# Patient Record
Sex: FEMALE | Race: WHITE | NOT HISPANIC OR LATINO | URBAN - METROPOLITAN AREA
[De-identification: names, ages, dates, MRNs, and addresses within clinical notes are randomized per-mention and may not be internally consistent; named-entity substitution may affect disease eponyms.]

---

## 2020-02-19 ENCOUNTER — OFFICE VISIT (OUTPATIENT)
Dept: NEUROSURGERY | Facility: CLINIC | Age: 60
End: 2020-02-19
Payer: MEDICARE

## 2020-02-19 VITALS
HEART RATE: 59 BPM | WEIGHT: 253.06 LBS | DIASTOLIC BLOOD PRESSURE: 78 MMHG | SYSTOLIC BLOOD PRESSURE: 134 MMHG | BODY MASS INDEX: 40.67 KG/M2 | HEIGHT: 66 IN

## 2020-02-19 DIAGNOSIS — M47.812 CERVICAL SPONDYLOSIS: Primary | ICD-10-CM

## 2020-02-19 DIAGNOSIS — M47.814 THORACIC SPONDYLOSIS: ICD-10-CM

## 2020-02-19 DIAGNOSIS — Z96.89 S/P INSERTION OF SPINAL CORD STIMULATOR: ICD-10-CM

## 2020-02-19 PROBLEM — M17.11 PRIMARY OSTEOARTHRITIS OF RIGHT KNEE: Status: ACTIVE | Noted: 2017-08-02

## 2020-02-19 PROBLEM — I36.1 NON-RHEUMATIC TRICUSPID VALVE INSUFFICIENCY: Status: ACTIVE | Noted: 2019-02-26

## 2020-02-19 PROBLEM — I34.0 NON-RHEUMATIC MITRAL REGURGITATION: Status: ACTIVE | Noted: 2019-02-26

## 2020-02-19 PROCEDURE — 99204 OFFICE O/P NEW MOD 45 MIN: CPT | Mod: S$GLB,,, | Performed by: STUDENT IN AN ORGANIZED HEALTH CARE EDUCATION/TRAINING PROGRAM

## 2020-02-19 PROCEDURE — 3008F PR BODY MASS INDEX (BMI) DOCUMENTED: ICD-10-PCS | Mod: CPTII,S$GLB,, | Performed by: STUDENT IN AN ORGANIZED HEALTH CARE EDUCATION/TRAINING PROGRAM

## 2020-02-19 PROCEDURE — 99999 PR PBB SHADOW E&M-EST. PATIENT-LVL III: ICD-10-PCS | Mod: PBBFAC,,, | Performed by: STUDENT IN AN ORGANIZED HEALTH CARE EDUCATION/TRAINING PROGRAM

## 2020-02-19 PROCEDURE — 99204 PR OFFICE/OUTPT VISIT, NEW, LEVL IV, 45-59 MIN: ICD-10-PCS | Mod: S$GLB,,, | Performed by: STUDENT IN AN ORGANIZED HEALTH CARE EDUCATION/TRAINING PROGRAM

## 2020-02-19 PROCEDURE — 3008F BODY MASS INDEX DOCD: CPT | Mod: CPTII,S$GLB,, | Performed by: STUDENT IN AN ORGANIZED HEALTH CARE EDUCATION/TRAINING PROGRAM

## 2020-02-19 PROCEDURE — 99999 PR PBB SHADOW E&M-EST. PATIENT-LVL III: CPT | Mod: PBBFAC,,, | Performed by: STUDENT IN AN ORGANIZED HEALTH CARE EDUCATION/TRAINING PROGRAM

## 2020-02-19 RX ORDER — TRAMADOL HYDROCHLORIDE AND ACETAMINOPHEN 37.5; 325 MG/1; MG/1
1 TABLET, FILM COATED ORAL
COMMUNITY
End: 2022-05-25

## 2020-02-19 RX ORDER — OMEPRAZOLE 40 MG/1
CAPSULE, DELAYED RELEASE ORAL
COMMUNITY
Start: 2020-01-17 | End: 2022-05-25

## 2020-02-19 RX ORDER — FLUTICASONE PROPIONATE 50 MCG
SPRAY, SUSPENSION (ML) NASAL
COMMUNITY
Start: 2020-02-14

## 2020-02-19 RX ORDER — LOVASTATIN 10 MG/1
10 TABLET ORAL
COMMUNITY
Start: 2016-12-21

## 2020-02-19 RX ORDER — ALBUTEROL SULFATE 90 UG/1
AEROSOL, METERED RESPIRATORY (INHALATION)
COMMUNITY
Start: 2019-11-18

## 2020-02-19 RX ORDER — TIZANIDINE 4 MG/1
2-4 TABLET ORAL
COMMUNITY

## 2020-02-19 RX ORDER — SOTALOL HYDROCHLORIDE 80 MG/1
40 TABLET ORAL
COMMUNITY
Start: 2016-12-21 | End: 2022-05-25

## 2020-02-19 RX ORDER — GABAPENTIN 600 MG/1
600 TABLET ORAL
COMMUNITY
End: 2022-05-25

## 2020-02-19 RX ORDER — VALSARTAN AND HYDROCHLOROTHIAZIDE 320; 25 MG/1; MG/1
1 TABLET, FILM COATED ORAL DAILY
COMMUNITY
End: 2023-12-20

## 2020-02-19 NOTE — PROGRESS NOTES
[facility] Neurosurgery  Clinic Consult     Consult Requested By: Jasiel Mcneil  PCP: Primary Doctor No    SUBJECTIVE:     Chief Complaint:   Chief Complaint   Patient presents with    Cervical Spine Pain (C-spine)     patient reports history of cervical fusion; reports neck pain; denies arm pain; bilateral numbness in the hands    Lumbar Spine Pain (L-Spine)     patient reports low back pain that radiates into the right leg; numbness and tingling; trouble walking;     Mid-back Pain     patient reports falling june or july of 2019; compression fracture       History of Present Illness:  Nel Butler is a 59 y.o. female with Afib, HTN, LV dysfunction, pulmonary hypertension who presents for evaluation of neck and back pain.  Patient reports history of cervical fusion.  She reports in 2003 she experienced acute neck pain with weakness in the left arm.  She underwent anterior cervical fusion.  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  She underwent extension of the fusion.  She is now fused C4-T1.  She reports chronic neck pain following the surgery.  She reports bilateral hand numbness and tingling.  She also complains of chronic low back pain that radiates into the right leg greater than left leg, with associated numbness and tingling.  She is status post right knee replacement and recently had blood drawn off of the right knee.  She reports she cannot sit for long periods of time.  She does not sleep well due to the pain.  r.  She has a spinal cord stimulator, Plum Baby, placed in January 2019.  She states the stimulator does not touch her leg pain.  He sees pain management and last received injections 2-3 years ago.    VAS 5/10  PHQ 21  Oswestry Disability Index 56    History reviewed. No pertinent past medical history.  History reviewed. No pertinent surgical history.  History reviewed. No pertinent family history.  Social History     Tobacco Use    Smoking  status: Never Smoker   Substance Use Topics    Alcohol use: Not on file    Drug use: Not on file      Review of patient's allergies indicates:  No Known Allergies    Current Outpatient Medications:     albuterol (PROVENTIL/VENTOLIN HFA) 90 mcg/actuation inhaler, INHALE 2 PUFFS INTO THE LUNGS EVERY 6 HOURS AS NEEDED, Disp: , Rfl:     fluticasone propionate (FLONASE) 50 mcg/actuation nasal spray, , Disp: , Rfl:     gabapentin (NEURONTIN) 600 MG tablet, Take 600 mg by mouth., Disp: , Rfl:     loratadine 10 mg Cap, Take by mouth., Disp: , Rfl:     lovastatin (MEVACOR) 10 MG tablet, Take 10 mg by mouth., Disp: , Rfl:     omeprazole (PRILOSEC) 40 MG capsule, , Disp: , Rfl:     sotalol (BETAPACE) 80 MG tablet, Take 40 mg by mouth., Disp: , Rfl:     tiZANidine (ZANAFLEX) 4 MG tablet, Take 2-4 mg by mouth., Disp: , Rfl:     tramadol-acetaminophen 37.5-325 mg (ULTRACET) 37.5-325 mg Tab, Take 1 tablet by mouth., Disp: , Rfl:     valsartan-hydrochlorothiazide (DIOVAN-HCT) 320-25 mg per tablet, Take 1 tablet by mouth once daily., Disp: , Rfl:     Review of Systems:   Constitutional: no fever, chills or night sweats. No changes in weight   Eyes: no visual changes   ENT: no nasal congestion or sore throat   Respiratory: no cough or shortness of breath   Cardiovascular: no chest pain or palpitations   Gastrointestinal: no nausea or vomiting   Genitourinary: no hematuria or dysuria   Integument/Breast: no rash or pruritis   Hematologic/Lymphatic: no easy bruising or lymphadenopathy   Musculoskeletal: +back pain, neck pain, myalgias, arthralgias   Neurological: no seizures or tremors +paresthesias  Behavioral/Psych: no auditory or visual hallucinations   Endocrine: no heat or cold intolerance       OBJECTIVE:     Vital Signs (Most Recent):  Pulse: (!) 59 (02/19/20 1106)  BP: 134/78 (02/19/20 1106)    Physical Exam:   General: well developed, well nourished, no distress.   Neurologic: Alert and oriented. Thought content  appropriate. GCS 15.   Head: normocephalic, atraumatic  Eyes: EOMI.  Neck: trachea midline, no JVD   Cardiovascular: no LE edema  Pulmonary: normal respirations, no signs of respiratory distress  Abdomen: non-distended  Sensory: intact to light touch throughout  Skin: Skin is warm, dry and intact.    Motor Strength: Moves all extremities spontaneously with good tone. No abnormal movements seen.     Strength  Deltoids Triceps Biceps Wrist Extension Wrist Flexion Hand  Interossei   Upper: R 5/5 5/5 5/5 5/5 5/5 5/5 4+/5    L 5/5 5/5 5/5 5/5 5/5 5/5 4+/5     Iliopsoas Quadriceps Knee  Flexion Tibialis  anterior Gastro- cnemius EHL    Lower: R 5/5 5/5 5/5 5/5 5/5 5/5     L 5/5 5/5 5/5 5/5 5/5 5/5      DTR's: 1+  Boyd: present left   Clonus: absent  Gait: normal    Tandem Gait: mild difficulty            Cervical Spine: mildly decreased ROM        Diagnostic Results:  I have independently reviewed the following imaging:  MRI: Reviewed  Thoracic reviewed  + SCS  mutlievel spondylosis  T11-12 moderate stenosis without cord compression, without cord signal change      ASSESSMENT/PLAN:     There are no diagnoses linked to this encounter.    Nel Butler is a 59 y.o. female   Complex history  Cervical fusion x2 with residual pain and sensory symptoms, no progression  S/p Spinal cord stimulator with chronic pain  Thoracic spondylosis eval  No LE s/s thoracic myelopathy    rec non op treatment  Educated on s/s thoracic myelopathy and when to seek evaluation      We discussed non-surgical treatment options.  We discussed medical management and referral options including anti-inflammatories, muscle relaxants, narcotics and neuropathic pain medications.  We discussed physical therapy for back strengthening and flexibility.  We talked about injection therapy for both treatment and diagnosis.    Patient verbalized understanding of plan. Encouraged to call with any questions or concerns.     This note was partially dictated  using voice recognition software, so please excuse any errors that were not corrected.

## 2020-03-12 ENCOUNTER — TELEPHONE (OUTPATIENT)
Dept: RADIOLOGY | Facility: HOSPITAL | Age: 60
End: 2020-03-12

## 2020-05-28 DIAGNOSIS — M54.12 CERVICAL RADICULOPATHY: Primary | ICD-10-CM

## 2020-05-28 RX ORDER — METHYLPREDNISOLONE 4 MG/1
TABLET ORAL
Qty: 1 PACKAGE | Refills: 0 | Status: SHIPPED | OUTPATIENT
Start: 2020-05-28 | End: 2020-06-18

## 2020-05-28 NOTE — TELEPHONE ENCOUNTER
Patient calling saying that she is having persistent, worsening left neck, shoulder and arm pain. She has not done any PT or injections since she was last here. She is asking what we would recommend for her at this point.

## 2022-05-25 ENCOUNTER — OFFICE VISIT (OUTPATIENT)
Dept: NEUROSURGERY | Facility: CLINIC | Age: 62
End: 2022-05-25
Payer: MEDICARE

## 2022-05-25 VITALS
DIASTOLIC BLOOD PRESSURE: 86 MMHG | HEART RATE: 75 BPM | RESPIRATION RATE: 18 BRPM | HEIGHT: 66 IN | BODY MASS INDEX: 40.67 KG/M2 | WEIGHT: 253.06 LBS | SYSTOLIC BLOOD PRESSURE: 176 MMHG

## 2022-05-25 DIAGNOSIS — M51.36 DDD (DEGENERATIVE DISC DISEASE), LUMBAR: ICD-10-CM

## 2022-05-25 DIAGNOSIS — Z96.89 SPINAL CORD STIMULATOR STATUS: ICD-10-CM

## 2022-05-25 DIAGNOSIS — Z98.1 HISTORY OF SPINAL FUSION: ICD-10-CM

## 2022-05-25 DIAGNOSIS — M47.812 CERVICAL SPONDYLOSIS: Primary | ICD-10-CM

## 2022-05-25 DIAGNOSIS — M50.10 CERVICAL DISC DISORDER WITH RADICULOPATHY: ICD-10-CM

## 2022-05-25 PROCEDURE — 3008F PR BODY MASS INDEX (BMI) DOCUMENTED: ICD-10-PCS | Mod: CPTII,S$GLB,, | Performed by: STUDENT IN AN ORGANIZED HEALTH CARE EDUCATION/TRAINING PROGRAM

## 2022-05-25 PROCEDURE — 3079F PR MOST RECENT DIASTOLIC BLOOD PRESSURE 80-89 MM HG: ICD-10-PCS | Mod: CPTII,S$GLB,, | Performed by: STUDENT IN AN ORGANIZED HEALTH CARE EDUCATION/TRAINING PROGRAM

## 2022-05-25 PROCEDURE — 99215 PR OFFICE/OUTPT VISIT, EST, LEVL V, 40-54 MIN: ICD-10-PCS | Mod: S$GLB,,, | Performed by: STUDENT IN AN ORGANIZED HEALTH CARE EDUCATION/TRAINING PROGRAM

## 2022-05-25 PROCEDURE — 3079F DIAST BP 80-89 MM HG: CPT | Mod: CPTII,S$GLB,, | Performed by: STUDENT IN AN ORGANIZED HEALTH CARE EDUCATION/TRAINING PROGRAM

## 2022-05-25 PROCEDURE — 99215 OFFICE O/P EST HI 40 MIN: CPT | Mod: S$GLB,,, | Performed by: STUDENT IN AN ORGANIZED HEALTH CARE EDUCATION/TRAINING PROGRAM

## 2022-05-25 PROCEDURE — 3008F BODY MASS INDEX DOCD: CPT | Mod: CPTII,S$GLB,, | Performed by: STUDENT IN AN ORGANIZED HEALTH CARE EDUCATION/TRAINING PROGRAM

## 2022-05-25 PROCEDURE — 3077F SYST BP >= 140 MM HG: CPT | Mod: CPTII,S$GLB,, | Performed by: STUDENT IN AN ORGANIZED HEALTH CARE EDUCATION/TRAINING PROGRAM

## 2022-05-25 PROCEDURE — 1159F PR MEDICATION LIST DOCUMENTED IN MEDICAL RECORD: ICD-10-PCS | Mod: CPTII,S$GLB,, | Performed by: STUDENT IN AN ORGANIZED HEALTH CARE EDUCATION/TRAINING PROGRAM

## 2022-05-25 PROCEDURE — 3077F PR MOST RECENT SYSTOLIC BLOOD PRESSURE >= 140 MM HG: ICD-10-PCS | Mod: CPTII,S$GLB,, | Performed by: STUDENT IN AN ORGANIZED HEALTH CARE EDUCATION/TRAINING PROGRAM

## 2022-05-25 PROCEDURE — 1159F MED LIST DOCD IN RCRD: CPT | Mod: CPTII,S$GLB,, | Performed by: STUDENT IN AN ORGANIZED HEALTH CARE EDUCATION/TRAINING PROGRAM

## 2022-05-25 RX ORDER — NITROFURANTOIN MACROCRYSTALS 50 MG/1
50 CAPSULE ORAL DAILY
COMMUNITY
Start: 2021-09-27 | End: 2023-12-20

## 2022-05-25 RX ORDER — MULTIVIT-MIN/FA/LYCOPEN/LUTEIN .4-300-25
1 TABLET ORAL DAILY
COMMUNITY

## 2022-05-25 RX ORDER — TRAMADOL HYDROCHLORIDE 50 MG/1
50 TABLET ORAL 2 TIMES DAILY PRN
COMMUNITY

## 2022-05-25 NOTE — PROGRESS NOTES
[facility]  Clinic Consult       PCP: Primary Doctor No    SUBJECTIVE:     Chief Complaint:   Chief Complaint   Patient presents with    Neck Pain     L sided neck pain into the arm and hand with numbness, tingling and arm weakness.  Having dizziness and blurry vision as well as headaches that come and go.  Also complains of mid back pain today.  Felling off balance.        History of Present Illness 2/19/2020  Nel Butler is a 59 y.o. female with Afib, HTN, LV dysfunction, pulmonary hypertension who presents for evaluation of neck and back pain.  Patient reports history of cervical fusion.  She reports in 2003 she experienced acute neck pain with weakness in the left arm.  She underwent anterior cervical fusion.  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  She underwent extension of the fusion.  She is now fused C4-T1.  She reports chronic neck pain following the surgery.  She reports bilateral hand numbness and tingling.  She also complains of chronic low back pain that radiates into the right leg greater than left leg, with associated numbness and tingling.  She is status post right knee replacement and recently had blood drawn off of the right knee.  She reports she cannot sit for long periods of time.  She does not sleep well due to the pain. She has a spinal cord stimulator, BMEYE, placed in January 2019.  She states the stimulator does not touch her leg pain.  He sees pain management and last received injections 2-3 years ago.        Interval History 5/25/2022  Nel Butler is a 61 y.o. female who presents for evaluation of neck pain, arm pain and arm weakness. Patient reports worsening of the left sided neck pain and left arm pain. She reports pain in the C8 distribution of the left arm with associated numbness/tingling. She feels as though her arm is weak, especially her hand. She does report worsening hand  dexterity and more frequently dropping objects. She continues to experience mid back and low back pain. Since her last visit, she was put on Eliquis for her afib. She was hospitalized due to her left arm pain and underwent complete cardiac workup.     Pertinent and recent history, provider evaluations, imaging and data reviewed in EPIC        Past Medical History:   Diagnosis Date    HLD (hyperlipidemia)     HTN (hypertension)     Osteopenia     Paroxysmal atrial fibrillation      Past Surgical History:   Procedure Laterality Date    CERVICAL FUSION      KNEE ARTHROPLASTY Right     SPINAL CORD STIMULATOR IMPLANT  2019     No family history on file.  Social History     Tobacco Use    Smoking status: Never Smoker      Review of patient's allergies indicates:   Allergen Reactions    Nsaids (non-steroidal anti-inflammatory drug) Other (See Comments)     gastritis       Current Outpatient Medications:     apixaban (ELIQUIS) 5 mg Tab, Take 5 mg by mouth 2 (two) times a day., Disp: , Rfl:     nitrofurantoin (MACRODANTIN) 50 MG capsule, Take 50 mg by mouth once daily., Disp: , Rfl:     albuterol (PROVENTIL/VENTOLIN HFA) 90 mcg/actuation inhaler, INHALE 2 PUFFS INTO THE LUNGS EVERY 6 HOURS AS NEEDED, Disp: , Rfl:     calcium carbonate/vitamin D3 (CALCIUM 600 + D,3, ORAL), Take 600 Units by mouth once daily., Disp: , Rfl:     fluticasone propionate (FLONASE) 50 mcg/actuation nasal spray, , Disp: , Rfl:     loratadine 10 mg Cap, Take by mouth., Disp: , Rfl:     lovastatin (MEVACOR) 10 MG tablet, Take 10 mg by mouth., Disp: , Rfl:     multivit-min-FA-lycopen-lutein (CENTRUM SILVER) 0.4 mg-300 mcg- 250 mcg Tab, Take 1 capsule by mouth once daily., Disp: , Rfl:     tiZANidine (ZANAFLEX) 4 MG tablet, Take 2-4 mg by mouth., Disp: , Rfl:     traMADoL (ULTRAM) 50 mg tablet, Take 50 mg by mouth 2 (two) times daily as needed., Disp: , Rfl:     valsartan-hydrochlorothiazide (DIOVAN-HCT) 320-25 mg per tablet, Take 1  "tablet by mouth once daily., Disp: , Rfl:     Review of Systems:   Constitutional: no fever, chills or night sweats. No changes in weight   Eyes: no visual changes   ENT: no nasal congestion or sore throat   Respiratory: no cough or shortness of breath   Cardiovascular: no chest pain or palpitations   Gastrointestinal: no nausea or vomiting   Genitourinary: no hematuria or dysuria   Integument/Breast: no rash or pruritis   Hematologic/Lymphatic: no easy bruising or lymphadenopathy   Musculoskeletal: +back pain, neck pain, myalgias, arthralgias   Neurological: no seizures or tremors +paresthesias  Behavioral/Psych: no auditory or visual hallucinations   Endocrine: no heat or cold intolerance         OBJECTIVE:     Vital Signs (Most Recent):  Pulse: 75 (05/25/22 1127)  Resp: 18 (05/25/22 1127)  BP: (!) 176/86 (05/25/22 1127)  Estimated body mass index is 40.85 kg/m² as calculated from the following:    Height as of this encounter: 5' 6" (1.676 m).    Weight as of this encounter: 114.8 kg (253 lb 1.4 oz).    Physical Exam:   General: well developed, well nourished, no distress.   Neurologic: Alert and oriented. Thought content appropriate. GCS 15.   Head: normocephalic, atraumatic  Eyes: EOMI.  Neck: trachea midline, no JVD   Cardiovascular: no LE edema  Pulmonary: normal respirations, no signs of respiratory distress  Abdomen: non-distended  Sensory: diminished left ulnar distribution   Skin: Skin is warm, dry and intact.     Motor Strength: Moves all extremities spontaneously with good tone. No abnormal movements seen.      Strength   Deltoids Triceps Biceps Wrist Extension Wrist Flexion Hand  Interossei   Upper: R 5/5 5/5 5/5 5/5 5/5 5/5 4+/5     L 5/5 5/5 5/5 5/5 5/5 5/5 4/5       Iliopsoas Quadriceps Knee  Flexion Tibialis  anterior Gastro- cnemius EHL     Lower: R 5/5 5/5 5/5 5/5 5/5 5/5       L 5/5 5/5 5/5 5/5 5/5 5/5        DTR's: 1+  Boyd: absent   Gait: wide, slow                   Tandem Gait: " deferred           Cervical Spine: mildly decreased ROM, TTP   +Tinel's left elbow         Diagnostic Results:  I have independently reviewed the following imaging:  MRI: Reviewed  r    C4-T1 ACDF 2 plates C4-5 and 7-1  C2-3 mild anterolisthesis  C3-4 disc collapse, anterior osteophyte  Central disc bulge abutting cord  Dorsal csf present  Hx of csc with mid cervical myelomalaica        ASSESSMENT/PLAN:     Cervical spondylosis  -     CT Cervical Spine Without Contrast; Future; Expected date: 05/25/2022  -     X-Ray Cervical Spine AP Lat with Flexion  Extension; Future; Expected date: 05/25/2022  -     DXA Bone Density Spine And Hip; Future; Expected date: 05/25/2022    DDD (degenerative disc disease), lumbar    History of spinal fusion    Cervical disc disorder with radiculopathy    Spinal cord stimulator status    BMI 40.0-44.9, adult        Nel Butler is a 61 y.o. female  With a very complex hx  Of neck and back , arm and leg pain  Hx of multiple cervical surgeries , carotid incision on left  Low transvere on right  scs placed    All of this by outside surgeons, lives in [facility]  Chronic imbalance and myelopathy secondary    C4-T1 ACDF with 2 remote plates  Myelomalacia mid cervical without compression    C2-3 mild anterolistehsis  C3-4 severe collapse, ventral disc/osteophyte impressing cord with loss of csf  dorasal remains    Main new c/o is left arm pain  Has c3-4 ASD no previous comparison  uncertain this is the left arm pain generator   Myelopathy stable   Will get CT assess fusions/ bony anatomy  And diagnostic/therapeutic C3-4 focused BRADLEY      Very complicated problems and multifactorial etiology    Will see has she responds                       Patient verbalized understanding of plan. Encouraged to call with any questions or concerns.     This note was partially dictated using voice recognition software, so please excuse any errors that were not corrected.

## 2022-06-02 ENCOUNTER — TELEPHONE (OUTPATIENT)
Dept: RADIOLOGY | Facility: HOSPITAL | Age: 62
End: 2022-06-02
Payer: MEDICARE

## 2022-06-06 ENCOUNTER — TELEPHONE (OUTPATIENT)
Dept: PAIN MEDICINE | Facility: CLINIC | Age: 62
End: 2022-06-06
Payer: MEDICARE

## 2022-06-06 ENCOUNTER — HOSPITAL ENCOUNTER (OUTPATIENT)
Dept: RADIOLOGY | Facility: HOSPITAL | Age: 62
Discharge: HOME | End: 2022-06-06
Attending: STUDENT IN AN ORGANIZED HEALTH CARE EDUCATION/TRAINING PROGRAM
Payer: MEDICARE

## 2022-06-06 ENCOUNTER — HOSPITAL ENCOUNTER (OUTPATIENT)
Dept: RADIOLOGY | Facility: HOSPITAL | Age: 62
Discharge: HOME | End: 2022-06-06
Attending: STUDENT IN AN ORGANIZED HEALTH CARE EDUCATION/TRAINING PROGRAM

## 2022-06-06 DIAGNOSIS — M47.812 CERVICAL SPONDYLOSIS: ICD-10-CM

## 2022-06-06 PROCEDURE — 77080 DXA BONE DENSITY AXIAL: CPT | Mod: TC,PO

## 2022-06-06 PROCEDURE — 72050 X-RAY EXAM NECK SPINE 4/5VWS: CPT | Mod: TC,PO

## 2022-06-06 PROCEDURE — 72125 CT NECK SPINE W/O DYE: CPT | Mod: TC,PO

## 2022-06-06 NOTE — TELEPHONE ENCOUNTER
Call placed to Pt to schedule for cervical BRADLEY with IV Sedation. Procedure scheduled for 06-22-22. F/U scheduled for 7-7-22. Pt is taking Eliquis ordered by Dr. Mikel Ramirez at [facility]. Clearance request forwarded for approval to hold x7 days.Pt is aware that she should not hold until clearance is received.

## 2022-06-10 ENCOUNTER — TELEPHONE (OUTPATIENT)
Dept: PAIN MEDICINE | Facility: CLINIC | Age: 62
End: 2022-06-10
Payer: MEDICARE

## 2022-06-10 DIAGNOSIS — M54.12 CERVICAL RADICULOPATHY: Primary | ICD-10-CM

## 2022-06-10 RX ORDER — SODIUM CHLORIDE, SODIUM LACTATE, POTASSIUM CHLORIDE, CALCIUM CHLORIDE 600; 310; 30; 20 MG/100ML; MG/100ML; MG/100ML; MG/100ML
INJECTION, SOLUTION INTRAVENOUS CONTINUOUS
Status: CANCELLED | OUTPATIENT
Start: 2022-06-22

## 2022-06-10 NOTE — TELEPHONE ENCOUNTER
Call placed to notify Pt that clearance orders have been received and approved for her to hold her Eliquis 7 days prior to her procedure. Pt verbalized understanding. Cervical BRADLEY moved to 6-29-22. Pt verbalized understanding.

## 2022-06-24 RX ORDER — MONTELUKAST SODIUM 10 MG/1
10 TABLET ORAL NIGHTLY
COMMUNITY

## 2022-06-24 RX ORDER — OMEPRAZOLE 10 MG/1
10 CAPSULE, DELAYED RELEASE ORAL DAILY
COMMUNITY
End: 2022-08-18

## 2022-06-24 RX ORDER — GABAPENTIN 300 MG/1
300 CAPSULE ORAL NIGHTLY
COMMUNITY
End: 2023-12-20

## 2022-06-29 ENCOUNTER — HOSPITAL ENCOUNTER (OUTPATIENT)
Facility: HOSPITAL | Age: 62
Discharge: HOME | End: 2022-06-29
Attending: ANESTHESIOLOGY | Admitting: ANESTHESIOLOGY
Payer: MEDICARE

## 2022-06-29 ENCOUNTER — HOSPITAL ENCOUNTER (OUTPATIENT)
Dept: RADIOLOGY | Facility: HOSPITAL | Age: 62
Discharge: HOME | End: 2022-06-29
Attending: ANESTHESIOLOGY
Payer: MEDICARE

## 2022-06-29 VITALS
SYSTOLIC BLOOD PRESSURE: 143 MMHG | RESPIRATION RATE: 16 BRPM | TEMPERATURE: 98 F | HEART RATE: 71 BPM | HEIGHT: 66 IN | DIASTOLIC BLOOD PRESSURE: 74 MMHG | OXYGEN SATURATION: 100 % | BODY MASS INDEX: 35.36 KG/M2 | WEIGHT: 220 LBS

## 2022-06-29 DIAGNOSIS — M54.12 CERVICAL RADICULOPATHY: Primary | ICD-10-CM

## 2022-06-29 DIAGNOSIS — M54.2 NECK PAIN: ICD-10-CM

## 2022-06-29 PROCEDURE — 76000 FLUOROSCOPY <1 HR PHYS/QHP: CPT | Mod: TC,PO

## 2022-06-29 PROCEDURE — 63600175 PHARM REV CODE 636 W HCPCS: Mod: PO | Performed by: ANESTHESIOLOGY

## 2022-06-29 PROCEDURE — 25500020 PHARM REV CODE 255: Mod: PO | Performed by: ANESTHESIOLOGY

## 2022-06-29 PROCEDURE — 62321 NJX INTERLAMINAR CRV/THRC: CPT | Mod: PO | Performed by: ANESTHESIOLOGY

## 2022-06-29 PROCEDURE — 25000003 PHARM REV CODE 250: Mod: PO | Performed by: ANESTHESIOLOGY

## 2022-06-29 PROCEDURE — 62321 NJX INTERLAMINAR CRV/THRC: CPT | Mod: ,,, | Performed by: ANESTHESIOLOGY

## 2022-06-29 PROCEDURE — 62321 PR INJ CERV/THORAC, W/GUIDANCE: ICD-10-PCS | Mod: ,,, | Performed by: ANESTHESIOLOGY

## 2022-06-29 RX ORDER — DEXAMETHASONE SODIUM PHOSPHATE 10 MG/ML
INJECTION INTRAMUSCULAR; INTRAVENOUS
Status: DISCONTINUED | OUTPATIENT
Start: 2022-06-29 | End: 2022-06-29 | Stop reason: HOSPADM

## 2022-06-29 RX ORDER — SODIUM CHLORIDE 0.9 G/100ML
IRRIGANT IRRIGATION
Status: DISCONTINUED | OUTPATIENT
Start: 2022-06-29 | End: 2022-06-29 | Stop reason: HOSPADM

## 2022-06-29 RX ORDER — SODIUM CHLORIDE, SODIUM LACTATE, POTASSIUM CHLORIDE, CALCIUM CHLORIDE 600; 310; 30; 20 MG/100ML; MG/100ML; MG/100ML; MG/100ML
INJECTION, SOLUTION INTRAVENOUS CONTINUOUS
Status: DISCONTINUED | OUTPATIENT
Start: 2022-06-29 | End: 2022-06-29 | Stop reason: HOSPADM

## 2022-06-29 RX ORDER — LIDOCAINE HYDROCHLORIDE 10 MG/ML
INJECTION, SOLUTION EPIDURAL; INFILTRATION; INTRACAUDAL; PERINEURAL
Status: DISCONTINUED | OUTPATIENT
Start: 2022-06-29 | End: 2022-06-29 | Stop reason: HOSPADM

## 2022-06-29 RX ORDER — MIDAZOLAM HYDROCHLORIDE 2 MG/2ML
INJECTION, SOLUTION INTRAMUSCULAR; INTRAVENOUS
Status: DISCONTINUED | OUTPATIENT
Start: 2022-06-29 | End: 2022-06-29 | Stop reason: HOSPADM

## 2022-06-29 RX ADMIN — SODIUM CHLORIDE, SODIUM LACTATE, POTASSIUM CHLORIDE, AND CALCIUM CHLORIDE: .6; .31; .03; .02 INJECTION, SOLUTION INTRAVENOUS at 03:06

## 2022-06-29 NOTE — H&P
[place] - Surgery  History & Physical - Short Stay  Pain Management       SUBJECTIVE:     Procedure: Procedure(s) (LRB):  Injection-steroid-epidural-cervical (N/A)    Chief Complaint/Reason for Admission:  Cervical radiculopathy [M54.12]    PTA Medications   Medication Sig    apixaban (ELIQUIS) 5 mg Tab Take 5 mg by mouth 2 (two) times a day.    calcium carbonate/vitamin D3 (CALCIUM 600 + D,3, ORAL) Take 600 Units by mouth once daily.    fluticasone propionate (FLONASE) 50 mcg/actuation nasal spray     gabapentin (NEURONTIN) 300 MG capsule Take 300 mg by mouth every evening.    lovastatin (MEVACOR) 10 MG tablet Take 10 mg by mouth.    montelukast (SINGULAIR) 10 mg tablet Take 10 mg by mouth every evening.    multivit-min-FA-lycopen-lutein (CENTRUM SILVER) 0.4 mg-300 mcg- 250 mcg Tab Take 1 capsule by mouth once daily.    nitrofurantoin (MACRODANTIN) 50 MG capsule Take 50 mg by mouth once daily.    omeprazole (PRILOSEC) 10 MG capsule Take 10 mg by mouth once daily.    traMADoL (ULTRAM) 50 mg tablet Take 50 mg by mouth 2 (two) times daily as needed.    valsartan-hydrochlorothiazide (DIOVAN-HCT) 320-25 mg per tablet Take 1 tablet by mouth once daily.    albuterol (PROVENTIL/VENTOLIN HFA) 90 mcg/actuation inhaler INHALE 2 PUFFS INTO THE LUNGS EVERY 6 HOURS AS NEEDED    loratadine 10 mg Cap Take by mouth.    tiZANidine (ZANAFLEX) 4 MG tablet Take 2-4 mg by mouth.       Review of patient's allergies indicates:   Allergen Reactions    Nsaids (non-steroidal anti-inflammatory drug) Other (See Comments)     gastritis       Past Medical History:   Diagnosis Date    HLD (hyperlipidemia)     HTN (hypertension)     Osteopenia     Paroxysmal atrial fibrillation      Past Surgical History:   Procedure Laterality Date    CERVICAL FUSION      KNEE ARTHROPLASTY Right     SPINAL CORD STIMULATOR IMPLANT  2019     History reviewed. No pertinent family history.  Social History     Tobacco Use    Smoking status:  Never Smoker   Substance Use Topics    Alcohol use: Not Currently    Drug use: Never        Current Facility-Administered Medications:     lactated ringers infusion, , Intravenous, Continuous, Jc Martell MD    Review of Systems:  General ROS: negative for - fever  Dermatological ROS: negative for rash    OBJECTIVE:     Vital Signs (Most Recent):  Temp: 98.2 °F (36.8 °C) (06/29/22 1435)  Pulse: 70 (06/29/22 1435)  Resp: 17 (06/29/22 1435)  BP: (!) 143/72 (06/29/22 1435)  SpO2: 99 % (06/29/22 1435)  Body mass index is 35.51 kg/m².    Physical Exam:  General appearance - alert, well appearing, and in no distress  Mental status - AOx3  Eyes - pupils equal and reactive, extraocular eye movements intact  Heart - normal rate, regular rhythm, normal S1, S2, no murmurs, rubs, clicks or gallops  Chest - clear to auscultation, no wheezes, rales or rhonchi, symmetric air entry  Abdomen - soft, nontender, nondistended, no masses or organomegaly  Neurological - alert, oriented, normal speech, no focal findings or movement disorder noted  Extremities - peripheral pulses normal, no pedal edema, no clubbing or cyanosis      ASSESSMENT/PLAN:     There are no hospital problems to display for this patient.     Referred by neurosurgery for cervical BRADLEY.  No changes since seen on 5/25/22 by nsgy. Denies any new numbness or weakness, continues to have her typical weakness.    Held eliquis for 3 days.  plts 235 on 4/12/22.    We will proceed with cervical BRADLEY. The risks and benefits of this intervention, and alternative therapies were discussed with the patient.  The discussion of risks included infection, bleeding, need for additional procedures or surgery, nerve damage.  Questions regarding the procedure, risks, expected outcome, and possible side effects were solicited and answered to the patient's satisfaction.  Nel Butler wishes to proceed with the injection or procedure.  Written consent was obtained.      Proceed with  intervention as scheduled.    Jc Martell M.D.  Interventional Pain Medicine / Anesthesiology

## 2022-06-29 NOTE — OP NOTE
"Procedure Note    Procedure Date: 6/29/2022    Procedure Performed:  C7-T1 cervical interlaminar epidural steroid injection under fluoroscopy.    Indications: Patient has failed conservative therapy.      Pre-op diagnosis: Cervical Radiculopathy    Post-op diagnosis: same    Physician: Jc Martell MD    IV anxiolysis medications: versed 2mg    Medications injected: Dexamethasone 15mg, 3.5 mL sterile, preservative-free normal saline.    Local anesthetic used: 1% Lidocaine, 1 ml, 8.4% sodium bicarbonate 0.25ml    Estimated Blood Loss: none    Complications:  none    Technique:  The patient was interviewed in the holding area and Risks/Benefits were discussed, including, but not limited to, the possibility of new or different pain, bleeding or infection.   All questions were answered.  The patient understood and accepted risks.  Consent was verfied.  A time-out was taken to identify patient and procedure prior to starting the procedure.  With the patient laying in a prone position with the neck in a mid-flexed forward position, the area was prepped and draped in the usual sterile fashion using ChloraPrep and a fenestrated drape.  The area was determined under AP fluoroscopic guidance.  The skin and subcutaneous tissues overlying the targeted interspace were anesthetized with 3-5 mL of 1% Lidocaine using a 25G 1.5" needle.  A 20G, 3.5" Tuohy epidural needle was inserted through the anesthetized skin and directed toward the interspace under fluoroscopic guidance until T1 lamina was contacted.  The fluoroscope was then adjusted to yield a contralateral oblique view of the C7-T1 interspace.  The epidural needle was incrementally walked cephalad off of the lamina until the ligamentum flavum was engaged. From this point, a loss-of-resistance technique was used to identify entrance of the needle into the epidural space.  Once the tip of the needle was in the desired position, the contrast dye Omnipaque was injected to " determine placement and no vascular uptake.  Then, after negative aspiration, dexamethasone 15 mg + 3.5 cc NS was injected.  The needle was flushed with normal saline and removed. The contrast was seen to be displaced after injection. Patient was awake/responsive during all injections.  The patient tolerated the procedure well and was transferred to the [facility] in stable condition.  The patient was monitored after the procedure and was given post-procedure and discharge instructions to follow at home. The patient was discharged in a stable condition.

## 2022-06-29 NOTE — PLAN OF CARE
Pt meets criteria for discharge. Vital signs stable. Pt without falls. Discharge teaching complete. Questions answered.

## 2022-06-29 NOTE — DISCHARGE SUMMARY
[facility]  Discharge Note  Short Stay    Admit Date: 6/29/2022    Discharge Date: 6/29/2022    Attending Physician: Jc Martell     Discharge Provider: Jc Martell    Diagnoses:  There are no hospital problems to display for this patient.      Discharged Condition: Good    Final Diagnoses: Cervical radiculopathy [M54.12]    Disposition: Home or Self Care    Hospital Course: No complications, uneventful    Outcome of Hospitalization, Treatment, Procedure, or Surgery:  Patient was admitted for outpatient interventional pain management procedure. The patient tolerated the procedure well with no complications.    Follow up/Patient Instructions:  Follow up as scheduled in Pain Management office in 2-3 weeks.  Patient has received instructions and follow up date and time.    Medications:  Continue previous medications, except restart eliquis in 24 hours    Discharge Procedure Orders   Notify your health care provider if you experience any of the following:  temperature >100.4     Notify your health care provider if you experience any of the following:  persistent nausea and vomiting or diarrhea     Notify your health care provider if you experience any of the following:  severe uncontrolled pain     Notify your health care provider if you experience any of the following:  redness, tenderness, or signs of infection (pain, swelling, redness, odor or green/yellow discharge around incision site)     Notify your health care provider if you experience any of the following:  difficulty breathing or increased cough     Notify your health care provider if you experience any of the following:  severe persistent headache     Notify your health care provider if you experience any of the following:  worsening rash     Notify your health care provider if you experience any of the following:  persistent dizziness, light-headedness, or visual disturbances     Notify your health care provider if you experience any of the  following:  increased confusion or weakness     Activity as tolerated

## 2022-07-17 ENCOUNTER — PATIENT MESSAGE (OUTPATIENT)
Dept: PAIN MEDICINE | Facility: CLINIC | Age: 62
End: 2022-07-17
Payer: MEDICARE

## 2022-08-18 ENCOUNTER — OFFICE VISIT (OUTPATIENT)
Dept: PAIN MEDICINE | Facility: CLINIC | Age: 62
End: 2022-08-18
Payer: MEDICARE

## 2022-08-18 VITALS
BODY MASS INDEX: 42.52 KG/M2 | SYSTOLIC BLOOD PRESSURE: 142 MMHG | DIASTOLIC BLOOD PRESSURE: 66 MMHG | HEIGHT: 66 IN | WEIGHT: 264.56 LBS | HEART RATE: 64 BPM

## 2022-08-18 DIAGNOSIS — M54.12 CERVICAL RADICULOPATHY: Primary | ICD-10-CM

## 2022-08-18 DIAGNOSIS — M50.30 DDD (DEGENERATIVE DISC DISEASE), CERVICAL: ICD-10-CM

## 2022-08-18 PROCEDURE — 99999 PR PBB SHADOW E&M-EST. PATIENT-LVL III: CPT | Mod: PBBFAC,,, | Performed by: ANESTHESIOLOGY

## 2022-08-18 PROCEDURE — 99999 PR PBB SHADOW E&M-EST. PATIENT-LVL III: ICD-10-PCS | Mod: PBBFAC,,, | Performed by: ANESTHESIOLOGY

## 2022-08-18 PROCEDURE — 1159F MED LIST DOCD IN RCRD: CPT | Mod: CPTII,S$GLB,, | Performed by: ANESTHESIOLOGY

## 2022-08-18 PROCEDURE — 3077F PR MOST RECENT SYSTOLIC BLOOD PRESSURE >= 140 MM HG: ICD-10-PCS | Mod: CPTII,S$GLB,, | Performed by: ANESTHESIOLOGY

## 2022-08-18 PROCEDURE — 3077F SYST BP >= 140 MM HG: CPT | Mod: CPTII,S$GLB,, | Performed by: ANESTHESIOLOGY

## 2022-08-18 PROCEDURE — 1160F RVW MEDS BY RX/DR IN RCRD: CPT | Mod: CPTII,S$GLB,, | Performed by: ANESTHESIOLOGY

## 2022-08-18 PROCEDURE — 99214 PR OFFICE/OUTPT VISIT, EST, LEVL IV, 30-39 MIN: ICD-10-PCS | Mod: S$GLB,,, | Performed by: ANESTHESIOLOGY

## 2022-08-18 PROCEDURE — 1160F PR REVIEW ALL MEDS BY PRESCRIBER/CLIN PHARMACIST DOCUMENTED: ICD-10-PCS | Mod: CPTII,S$GLB,, | Performed by: ANESTHESIOLOGY

## 2022-08-18 PROCEDURE — 3078F PR MOST RECENT DIASTOLIC BLOOD PRESSURE < 80 MM HG: ICD-10-PCS | Mod: CPTII,S$GLB,, | Performed by: ANESTHESIOLOGY

## 2022-08-18 PROCEDURE — 1159F PR MEDICATION LIST DOCUMENTED IN MEDICAL RECORD: ICD-10-PCS | Mod: CPTII,S$GLB,, | Performed by: ANESTHESIOLOGY

## 2022-08-18 PROCEDURE — 3008F BODY MASS INDEX DOCD: CPT | Mod: CPTII,S$GLB,, | Performed by: ANESTHESIOLOGY

## 2022-08-18 PROCEDURE — 99214 OFFICE O/P EST MOD 30 MIN: CPT | Mod: S$GLB,,, | Performed by: ANESTHESIOLOGY

## 2022-08-18 PROCEDURE — 3008F PR BODY MASS INDEX (BMI) DOCUMENTED: ICD-10-PCS | Mod: CPTII,S$GLB,, | Performed by: ANESTHESIOLOGY

## 2022-08-18 PROCEDURE — 3078F DIAST BP <80 MM HG: CPT | Mod: CPTII,S$GLB,, | Performed by: ANESTHESIOLOGY

## 2022-08-18 RX ORDER — METHYLPREDNISOLONE 4 MG/1
TABLET ORAL
COMMUNITY
Start: 2022-04-06 | End: 2023-12-20

## 2022-08-18 RX ORDER — ONDANSETRON 4 MG/1
4 TABLET, FILM COATED ORAL 2 TIMES DAILY PRN
COMMUNITY
Start: 2022-04-18

## 2022-08-18 NOTE — PROGRESS NOTES
[facility] New Patient Evaluation    Referred by: Dr. Ceja  Reason for referral: neck pain    CC:   Chief Complaint   Patient presents with    Neck Pain      Last 3 PDI Scores 8/18/2022   Pain Disability Index (PDI) 24       HPI:   Nel Butler is a 61 y.o. female who presents with neck pain.  She was referred to me by Neurosurgery for BRADLEY and is now s/p cervical BRADLEY on 6/29/22.  She reports she had near complete relief of her left-sided arm pain for about 2 weeks following the injection however after that the pain started to return.  Today she reports her pain is 8/10, constant, aching with radiating pain from the neck to the left shoulder and down the left arm.  She reports she continues to have chronic weakness of her left  strength that has been present since prior to her other surgeries.  Her pain is worse with sitting, lying, walking, nighttime and relieved with medications.    History:    Past Medical History:   Diagnosis Date    HLD (hyperlipidemia)     HTN (hypertension)     Osteopenia     Paroxysmal atrial fibrillation        Past Surgical History:   Procedure Laterality Date    CERVICAL FUSION      EPIDURAL STEROID INJECTION INTO CERVICAL SPINE N/A 6/29/2022    Procedure: Injection-steroid-epidural-cervical;  Surgeon: Jc Martell MD;  Location: [facility];  Service: Pain Management;  Laterality: N/A;    KNEE ARTHROPLASTY Right     SPINAL CORD STIMULATOR IMPLANT  2019       No family history on file.    Social History     Socioeconomic History    Marital status:    Tobacco Use    Smoking status: Never Smoker   Substance and Sexual Activity    Alcohol use: Not Currently    Drug use: Never       Review of patient's allergies indicates:   Allergen Reactions    Nsaids (non-steroidal anti-inflammatory drug) Other (See Comments)     gastritis       Review of Systems:  General ROS: negative for - fever  Psychological ROS: negative for - hostility  Hematological and  "Lymphatic ROS: negative for - bleeding problems  Endocrine ROS: negative for - unexpected weight changes  Respiratory ROS: no cough, shortness of breath, or wheezing  Cardiovascular ROS: no chest pain or dyspnea on exertion  Gastrointestinal ROS: no abdominal pain, change in bowel habits, or black or bloody stools  Musculoskeletal ROS: positive for - muscular weakness  Neurological ROS: positive for - numbness/tingling  Dermatological ROS: negative for rash    Physical Exam:  Vitals:    08/18/22 1109   BP: (!) 142/66   Pulse: 64   Weight: 120 kg (264 lb 8.8 oz)   Height: 5' 6" (1.676 m)   PainSc:   9   PainLoc: Neck     Body mass index is 42.7 kg/m².    Gen: NAD  Psych: mood appropriate for given condition  CV: 2+ radial pulse  HEENT: anicteric   Respiratory: non labored  Abd: soft nt, nd  Skin: intact  Sensation: intact to lt touch bilaterally in c4-t1   Reflexes: 0 b/l Bicep, tricep, Boyd negative  ROM: Cervical ROM full, shoulder, elbow and wrist ROM full  Tone:  Normal at elbow, wrist and shoulder   Inspection: no atrophy of bicep, FDI or APB noted  Special tests:  Palpation: tender cervical paraspinals, levator scapula and trapezius    Motor:    Right Left   C4 Shoulder Abduction  5  5   C5 Elbow Flexion    5  5   C6 Wrist Extension  5  5   C7 Elbow Extension   5  5   C8/T1 Hand Intrinsics   5  4                   Imaging:  I independently reviewed her cervical MRI is consistent with multilevel bilateral degenerative changes, adjacent segment disease with a central disc protrusion/extrusion at C3-4 causing at least mild to moderate central canal narrowing.      Labs:  BMP  No results found for: NA, K, CL, CO2, BUN, CREATININE, CALCIUM, ANIONGAP, ESTGFRAFRICA, EGFRNONAA  No results found for: ALT, AST, GGT, ALKPHOS, BILITOT    Assessment:   Problem List Items Addressed This Visit    None     Visit Diagnoses     Cervical radiculopathy    -  Primary    DDD (degenerative disc disease), cervical              61 " y.o. year old female with PMH pulmonary HTN, paroxysmal AFib, LANCE who presents with neck pain.  She was referred to me by Neurosurgery for BRADLEY and is now s/p cervical BRADLEY on 6/29/22.  She reports she had near complete relief of her left-sided arm pain for about 2 weeks following the injection however after that the pain started to return.  Today she reports her pain is 8/10, constant, aching with radiating pain from the neck to the left shoulder and down the left arm.  She reports she continues to have chronic weakness of her left  strength that has been present since prior to her other surgeries.  Her pain is worse with sitting, lying, walking, nighttime and relieved with medications.    - on exam she has 4-5 left  strength, intact sensation to light touch bilateral C4-T1  - I independently reviewed her cervical MRI is consistent with multilevel bilateral degenerative changes, adjacent segment disease with a central disc protrusion/extrusion at C3-4 causing at least mild to moderate central canal narrowing.    - she has completed formal physical therapy without significant relief of her pain  - at this time I am not sure there is a big benefit from repeating BRADLEY since she only got 2 weeks' worth of relief however I do think it is a positive sign that that 2 weeks did show excellent relief  - I would like her to follow-up with neurosurgery for repeat evaluation given the response to BRADLEY  - she takes tramadol for her pain which I think is very reasonable and she says that she still has some and does not need a refill.  She will also continue to take gabapentin for the neuropathic component of her pain as prescribed.  - follow-up as needed    : Reviewed and consistent with medication use as prescribed.    Jc Martell M.D.  Interventional Pain Medicine / Anesthesiology    This note was completed with dictation software and grammatical errors may exist.

## 2023-05-05 ENCOUNTER — TELEPHONE (OUTPATIENT)
Dept: NEUROSURGERY | Facility: CLINIC | Age: 63
End: 2023-05-05
Payer: MEDICARE

## 2023-05-05 NOTE — TELEPHONE ENCOUNTER
----- Message from Saleem Sanford sent at 5/5/2023  3:35 PM CDT -----  Regarding: appt for same issue  Contact: misa at [phone]  Type: Needs Medical Advice    Who Called:  misa    Symptoms (please be specific):  dizzy, headache, with pain in neck and arm    How long has patient had these symptoms:  started about a month ago not daily     Best Call Back Number: [phone]    Additional Information: pt has seen ent, cardio and they cleared pt. Pt got dizzy again this morning prompted call to dr wilkinson.

## 2023-05-10 ENCOUNTER — OFFICE VISIT (OUTPATIENT)
Dept: NEUROSURGERY | Facility: CLINIC | Age: 63
End: 2023-05-10
Payer: MEDICARE

## 2023-05-10 VITALS
SYSTOLIC BLOOD PRESSURE: 137 MMHG | WEIGHT: 264 LBS | HEART RATE: 60 BPM | BODY MASS INDEX: 42.43 KG/M2 | RESPIRATION RATE: 18 BRPM | HEIGHT: 66 IN | DIASTOLIC BLOOD PRESSURE: 78 MMHG

## 2023-05-10 DIAGNOSIS — G95.89 MYELOMALACIA OF CERVICAL CORD: Primary | ICD-10-CM

## 2023-05-10 DIAGNOSIS — G89.29 CHRONIC NONINTRACTABLE HEADACHE, UNSPECIFIED HEADACHE TYPE: ICD-10-CM

## 2023-05-10 DIAGNOSIS — R51.9 CHRONIC NONINTRACTABLE HEADACHE, UNSPECIFIED HEADACHE TYPE: ICD-10-CM

## 2023-05-10 PROCEDURE — 3075F SYST BP GE 130 - 139MM HG: CPT | Mod: CPTII,,, | Performed by: PHYSICIAN ASSISTANT

## 2023-05-10 PROCEDURE — 3078F DIAST BP <80 MM HG: CPT | Mod: CPTII,,, | Performed by: PHYSICIAN ASSISTANT

## 2023-05-10 PROCEDURE — 3075F PR MOST RECENT SYSTOLIC BLOOD PRESS GE 130-139MM HG: ICD-10-PCS | Mod: CPTII,,, | Performed by: PHYSICIAN ASSISTANT

## 2023-05-10 PROCEDURE — 3078F PR MOST RECENT DIASTOLIC BLOOD PRESSURE < 80 MM HG: ICD-10-PCS | Mod: CPTII,,, | Performed by: PHYSICIAN ASSISTANT

## 2023-05-10 PROCEDURE — 99214 PR OFFICE/OUTPT VISIT, EST, LEVL IV, 30-39 MIN: ICD-10-PCS | Mod: ,,, | Performed by: PHYSICIAN ASSISTANT

## 2023-05-10 PROCEDURE — 1159F PR MEDICATION LIST DOCUMENTED IN MEDICAL RECORD: ICD-10-PCS | Mod: CPTII,,, | Performed by: PHYSICIAN ASSISTANT

## 2023-05-10 PROCEDURE — 1159F MED LIST DOCD IN RCRD: CPT | Mod: CPTII,,, | Performed by: PHYSICIAN ASSISTANT

## 2023-05-10 PROCEDURE — 99214 OFFICE O/P EST MOD 30 MIN: CPT | Mod: ,,, | Performed by: PHYSICIAN ASSISTANT

## 2023-05-10 PROCEDURE — 1160F RVW MEDS BY RX/DR IN RCRD: CPT | Mod: CPTII,,, | Performed by: PHYSICIAN ASSISTANT

## 2023-05-10 PROCEDURE — 3008F BODY MASS INDEX DOCD: CPT | Mod: CPTII,,, | Performed by: PHYSICIAN ASSISTANT

## 2023-05-10 PROCEDURE — 1160F PR REVIEW ALL MEDS BY PRESCRIBER/CLIN PHARMACIST DOCUMENTED: ICD-10-PCS | Mod: CPTII,,, | Performed by: PHYSICIAN ASSISTANT

## 2023-05-10 PROCEDURE — 3008F PR BODY MASS INDEX (BMI) DOCUMENTED: ICD-10-PCS | Mod: CPTII,,, | Performed by: PHYSICIAN ASSISTANT

## 2023-05-10 RX ORDER — FLUOROURACIL 50 MG/G
CREAM TOPICAL
COMMUNITY
Start: 2023-04-03 | End: 2023-12-20

## 2023-05-10 NOTE — PROGRESS NOTES
[facility]  Clinic Consult       PCP: Primary Doctor No    SUBJECTIVE:     Chief Complaint:   Chief Complaint   Patient presents with    Cervical Spine Pain (C-spine)     Patient present to clinic today as dizziness; neck and arm pain. Patient states of neck pain radiating into Lt arm; weakness. Experiencing dizziness, headaches, and balance issues. Currently using cemo cream on face due to skin cancer. Symptoms of numbness/tingling of toes/feet. DX of previous RLS       Interval History 5/10/2023  Patient returns with dizziness and continued neck pain. It radiates into the left arm with weakness. She also reports headaches. She has cervical cord myelomalacia.     Pertinent and recent history, provider evaluations, imaging and data reviewed in EPIC      Interval History 5/25/2022  Nel Butler is a 60 yo female who presents for evaluation of neck pain, arm pain and arm weakness. Patient reports worsening of the left sided neck pain and left arm pain. She reports pain in the C8 distribution of the left arm with associated numbness/tingling. She feels as though her arm is weak, especially her hand. She does report worsening hand dexterity and more frequently dropping objects. She continues to experience mid back and low back pain. Since her last visit, she was put on Eliquis for her afib. She was hospitalized due to her left arm pain and underwent complete cardiac workup.       History of Present Illness 2/19/2020  Nel Butler is a 59 y.o. female with Afib, HTN, LV dysfunction, pulmonary hypertension who presents for evaluation of neck and back pain.  Patient reports history of cervical fusion.  She reports in 2003 she experienced acute neck pain with weakness in the left arm.  She underwent anterior cervical fusion.  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  She underwent extension of the fusion.  She is now fused C4-T1.  She  reports chronic neck pain following the surgery.  She reports bilateral hand numbness and tingling.  She also complains of chronic low back pain that radiates into the right leg greater than left leg, with associated numbness and tingling.  She is status post right knee replacement and recently had blood drawn off of the right knee.  She reports she cannot sit for long periods of time.  She does not sleep well due to the pain. She has a spinal cord stimulator, Rafter, placed in January 2019.  She states the stimulator does not touch her leg pain.  He sees pain management and last received injections 2-3 years ago.        Past Medical History:   Diagnosis Date    HLD (hyperlipidemia)     HTN (hypertension)     Osteopenia     Paroxysmal atrial fibrillation      Past Surgical History:   Procedure Laterality Date    CERVICAL FUSION      EPIDURAL STEROID INJECTION INTO CERVICAL SPINE N/A 6/29/2022    Procedure: Injection-steroid-epidural-cervical;  Surgeon: Jc Martell MD;  Location: [facility];  Service: Pain Management;  Laterality: N/A;    KNEE ARTHROPLASTY Right     SPINAL CORD STIMULATOR IMPLANT  2019     History reviewed. No pertinent family history.  Social History     Tobacco Use    Smoking status: Never   Substance Use Topics    Alcohol use: Not Currently    Drug use: Never      Review of patient's allergies indicates:   Allergen Reactions    Nsaids (non-steroidal anti-inflammatory drug) Other (See Comments)     gastritis       Current Outpatient Medications:     albuterol (PROVENTIL/VENTOLIN HFA) 90 mcg/actuation inhaler, INHALE 2 PUFFS INTO THE LUNGS EVERY 6 HOURS AS NEEDED, Disp: , Rfl:     apixaban (ELIQUIS) 5 mg Tab, Take 5 mg by mouth 2 (two) times a day., Disp: , Rfl:     calcium carbonate/vitamin D3 (CALCIUM 600 + D,3, ORAL), Take 600 Units by mouth once daily., Disp: , Rfl:     fluorouraciL (EFUDEX) 5 % cream, Apply topically., Disp: , Rfl:     fluticasone propionate (FLONASE) 50  "mcg/actuation nasal spray, , Disp: , Rfl:     gabapentin (NEURONTIN) 300 MG capsule, Take 300 mg by mouth every evening., Disp: , Rfl:     loratadine 10 mg Cap, Take by mouth., Disp: , Rfl:     lovastatin (MEVACOR) 10 MG tablet, Take 10 mg by mouth., Disp: , Rfl:     methylPREDNISolone (MEDROL DOSEPACK) 4 mg tablet,  Disp: , Rfl:     montelukast (SINGULAIR) 10 mg tablet, Take 10 mg by mouth every evening., Disp: , Rfl:     multivit-min-FA-lycopen-lutein (CENTRUM SILVER) 0.4 mg-300 mcg- 250 mcg Tab, Take 1 capsule by mouth once daily., Disp: , Rfl:     nitrofurantoin (MACRODANTIN) 50 MG capsule, Take 50 mg by mouth once daily., Disp: , Rfl:     ondansetron (ZOFRAN) 4 MG tablet, Take 4 mg by mouth 2 (two) times daily as needed., Disp: , Rfl:     tiZANidine (ZANAFLEX) 4 MG tablet, Take 2-4 mg by mouth., Disp: , Rfl:     traMADoL (ULTRAM) 50 mg tablet, Take 50 mg by mouth 2 (two) times daily as needed., Disp: , Rfl:     valsartan-hydrochlorothiazide (DIOVAN-HCT) 320-25 mg per tablet, Take 1 tablet by mouth once daily., Disp: , Rfl:     Review of Systems:   Constitutional: no fever, chills or night sweats. No changes in weight   Eyes: no visual changes   ENT: no nasal congestion or sore throat   Respiratory: no cough or shortness of breath   Cardiovascular: no chest pain or palpitations   Gastrointestinal: no nausea or vomiting   Genitourinary: no hematuria or dysuria   Integument/Breast: no rash or pruritis   Hematologic/Lymphatic: no easy bruising or lymphadenopathy   Musculoskeletal: +back pain, neck pain, myalgias, arthralgias   Neurological: no seizures or tremors +paresthesias  Behavioral/Psych: no auditory or visual hallucinations   Endocrine: no heat or cold intolerance         OBJECTIVE:     Vital Signs (Most Recent):  Pulse: 60 (05/10/23 1254)  Resp: 18 (05/10/23 1254)  BP: 137/78 (05/10/23 1254)  Estimated body mass index is 42.61 kg/m² as calculated from the following:    Height as of this encounter: 5' 6" " (1.676 m).    Weight as of this encounter: 119.7 kg (264 lb).    Physical Exam:   General: well developed, well nourished, no distress.   Neurologic: Alert and oriented. Thought content appropriate. GCS 15.   Head: normocephalic, atraumatic  Eyes: EOMI.  Neck: trachea midline, no JVD   Cardiovascular: no LE edema  Pulmonary: normal respirations, no signs of respiratory distress  Abdomen: non-distended  Sensory: diminished left ulnar distribution   Skin: Skin is warm, dry and intact.     Motor Strength: Moves all extremities spontaneously with good tone. No abnormal movements seen.      Strength   Deltoids Triceps Biceps Wrist Extension Wrist Flexion Hand  Interossei   Upper: R 5/5 5/5 5/5 5/5 5/5 5/5 4+/5     L 5/5 5/5 5/5 5/5 5/5 5/5 4/5       Iliopsoas Quadriceps Knee  Flexion Tibialis  anterior Gastro- cnemius EHL     Lower: R 5/5 5/5 5/5 5/5 5/5 5/5       L 5/5 5/5 5/5 5/5 5/5 5/5        DTR's: 1+  Boyd: absent   Gait: wide, slow                   Tandem Gait: deferred           Cervical Spine: mildly decreased ROM, TTP   +Tinel's left elbow         Diagnostic Results:  no new imaging         ASSESSMENT/PLAN:     Myelomalacia of cervical cord  -     Ambulatory referral/consult to Neurology; Future; Expected date: 05/17/2023  -     MRI Cervical Spine Without Contrast; Future; Expected date: 05/10/2023  -     X-Ray Cervical Spine AP Lat with Flexion  Extension; Future; Expected date: 05/10/2023    Chronic nonintractable headache, unspecified headache type  -     Ambulatory referral/consult to Neurology; Future; Expected date: 05/17/2023          Nel Butler is a 62 y.o. female with a complex history. She has undergone multiple cervical fusions. She has cervical cord myelomalacia with myelopathy. She feels as though her symptoms are worsening. We discussed obtaining updated imaging to rule out new compression. We also discussed her symptoms may be a sequelae of her myelomalacia. She will be referred to  neurology for another opinion of this. She also reports chronic headaches. We discussed referral to headache clinic for this. She will return with new imaging to discuss the results        Patient verbalized understanding of plan. Encouraged to call with any questions or concerns.     This note was partially dictated using voice recognition software, so please excuse any errors that were not corrected.

## 2023-05-11 ENCOUNTER — TELEPHONE (OUTPATIENT)
Dept: NEUROLOGY | Facility: CLINIC | Age: 63
End: 2023-05-11
Payer: MEDICARE

## 2023-05-11 NOTE — TELEPHONE ENCOUNTER
Spoke to the pt, appt scheduled on 6/26/23 at 1300 with Samina Lima. Date, time and location discussed.

## 2023-05-29 ENCOUNTER — PATIENT MESSAGE (OUTPATIENT)
Dept: RADIOLOGY | Facility: HOSPITAL | Age: 63
End: 2023-05-29
Payer: MEDICARE

## 2023-05-31 ENCOUNTER — HOSPITAL ENCOUNTER (OUTPATIENT)
Dept: RADIOLOGY | Facility: HOSPITAL | Age: 63
Discharge: HOME | End: 2023-05-31
Attending: PHYSICIAN ASSISTANT
Payer: MEDICARE

## 2023-05-31 ENCOUNTER — OFFICE VISIT (OUTPATIENT)
Dept: NEUROSURGERY | Facility: CLINIC | Age: 63
End: 2023-05-31
Payer: MEDICARE

## 2023-05-31 VITALS
DIASTOLIC BLOOD PRESSURE: 73 MMHG | HEART RATE: 83 BPM | BODY MASS INDEX: 42.43 KG/M2 | RESPIRATION RATE: 18 BRPM | WEIGHT: 264 LBS | SYSTOLIC BLOOD PRESSURE: 153 MMHG | HEIGHT: 66 IN

## 2023-05-31 DIAGNOSIS — Z98.1 HISTORY OF SPINAL FUSION: ICD-10-CM

## 2023-05-31 DIAGNOSIS — G95.89 MYELOMALACIA OF CERVICAL CORD: ICD-10-CM

## 2023-05-31 DIAGNOSIS — G95.89 MYELOMALACIA OF CERVICAL CORD: Primary | ICD-10-CM

## 2023-05-31 PROCEDURE — 99214 PR OFFICE/OUTPT VISIT, EST, LEVL IV, 30-39 MIN: ICD-10-PCS | Mod: S$GLB,,, | Performed by: PHYSICIAN ASSISTANT

## 2023-05-31 PROCEDURE — 3008F PR BODY MASS INDEX (BMI) DOCUMENTED: ICD-10-PCS | Mod: CPTII,S$GLB,, | Performed by: PHYSICIAN ASSISTANT

## 2023-05-31 PROCEDURE — 3077F PR MOST RECENT SYSTOLIC BLOOD PRESSURE >= 140 MM HG: ICD-10-PCS | Mod: CPTII,S$GLB,, | Performed by: PHYSICIAN ASSISTANT

## 2023-05-31 PROCEDURE — 72050 X-RAY EXAM NECK SPINE 4/5VWS: CPT | Mod: TC,FY,PO

## 2023-05-31 PROCEDURE — 1159F PR MEDICATION LIST DOCUMENTED IN MEDICAL RECORD: ICD-10-PCS | Mod: CPTII,S$GLB,, | Performed by: PHYSICIAN ASSISTANT

## 2023-05-31 PROCEDURE — 72050 X-RAY EXAM NECK SPINE 4/5VWS: CPT | Mod: 26,,, | Performed by: RADIOLOGY

## 2023-05-31 PROCEDURE — 1160F RVW MEDS BY RX/DR IN RCRD: CPT | Mod: CPTII,S$GLB,, | Performed by: PHYSICIAN ASSISTANT

## 2023-05-31 PROCEDURE — 72050 XR CERVICAL SPINE AP LAT WITH FLEX EXTEN: ICD-10-PCS | Mod: 26,,, | Performed by: RADIOLOGY

## 2023-05-31 PROCEDURE — 1159F MED LIST DOCD IN RCRD: CPT | Mod: CPTII,S$GLB,, | Performed by: PHYSICIAN ASSISTANT

## 2023-05-31 PROCEDURE — 3078F DIAST BP <80 MM HG: CPT | Mod: CPTII,S$GLB,, | Performed by: PHYSICIAN ASSISTANT

## 2023-05-31 PROCEDURE — 72141 MRI NECK SPINE W/O DYE: CPT | Mod: TC,PO

## 2023-05-31 PROCEDURE — 72141 MRI CERVICAL SPINE WITHOUT CONTRAST: ICD-10-PCS | Mod: 26,,, | Performed by: RADIOLOGY

## 2023-05-31 PROCEDURE — 3008F BODY MASS INDEX DOCD: CPT | Mod: CPTII,S$GLB,, | Performed by: PHYSICIAN ASSISTANT

## 2023-05-31 PROCEDURE — 3077F SYST BP >= 140 MM HG: CPT | Mod: CPTII,S$GLB,, | Performed by: PHYSICIAN ASSISTANT

## 2023-05-31 PROCEDURE — 99214 OFFICE O/P EST MOD 30 MIN: CPT | Mod: S$GLB,,, | Performed by: PHYSICIAN ASSISTANT

## 2023-05-31 PROCEDURE — 1160F PR REVIEW ALL MEDS BY PRESCRIBER/CLIN PHARMACIST DOCUMENTED: ICD-10-PCS | Mod: CPTII,S$GLB,, | Performed by: PHYSICIAN ASSISTANT

## 2023-05-31 PROCEDURE — 72141 MRI NECK SPINE W/O DYE: CPT | Mod: 26,,, | Performed by: RADIOLOGY

## 2023-05-31 PROCEDURE — 3078F PR MOST RECENT DIASTOLIC BLOOD PRESSURE < 80 MM HG: ICD-10-PCS | Mod: CPTII,S$GLB,, | Performed by: PHYSICIAN ASSISTANT

## 2023-05-31 NOTE — PROGRESS NOTES
[facility]  Clinic Consult       PCP: Primary Doctor No    SUBJECTIVE:     Chief Complaint:   Chief Complaint   Patient presents with    Follow-up     Follow up with imaging      Interval History 5/31/2023  Returns with new imaging. No changes since her last visit. Left arm pain/weakness stable. She does have a visit with headache clinic.       Interval History 5/10/2023  Patient returns with dizziness and continued neck pain. It radiates into the left arm with weakness. She also reports headaches. She has cervical cord myelomalacia.     Pertinent and recent history, provider evaluations, imaging and data reviewed in [facility]      Interval History 5/25/2022  Nel Butler is a 62 yo female who presents for evaluation of neck pain, arm pain and arm weakness. Patient reports worsening of the left sided neck pain and left arm pain. She reports pain in the C8 distribution of the left arm with associated numbness/tingling. She feels as though her arm is weak, especially her hand. She does report worsening hand dexterity and more frequently dropping objects. She continues to experience mid back and low back pain. Since her last visit, she was put on Eliquis for her afib. She was hospitalized due to her left arm pain and underwent complete cardiac workup.       History of Present Illness 2/19/2020  Nel Butler is a 59 y.o. female with Afib, HTN, LV dysfunction, pulmonary hypertension who presents for evaluation of neck and back pain.  Patient reports history of cervical fusion.  She reports in 2003 she experienced acute neck pain with weakness in the left arm.  She underwent anterior cervical fusion.  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  She underwent extension of the fusion.  She is now fused C4-T1.  She reports chronic neck pain following the surgery.  She reports bilateral hand numbness and tingling.  She also complains of chronic  low back pain that radiates into the right leg greater than left leg, with associated numbness and tingling.  She is status post right knee replacement and recently had blood drawn off of the right knee.  She reports she cannot sit for long periods of time.  She does not sleep well due to the pain. She has a spinal cord stimulator, [employer], placed in January 2019.  She states the stimulator does not touch her leg pain.  He sees pain management and last received injections 2-3 years ago.        Past Medical History:   Diagnosis Date    HLD (hyperlipidemia)     HTN (hypertension)     Osteopenia     Paroxysmal atrial fibrillation      Past Surgical History:   Procedure Laterality Date    CERVICAL FUSION      EPIDURAL STEROID INJECTION INTO CERVICAL SPINE N/A 6/29/2022    Procedure: Injection-steroid-epidural-cervical;  Surgeon: Jc Martell MD;  Location: [facility] OR;  Service: Pain Management;  Laterality: N/A;    KNEE ARTHROPLASTY Right     SPINAL CORD STIMULATOR IMPLANT  2019     History reviewed. No pertinent family history.  Social History     Tobacco Use    Smoking status: Never   Substance Use Topics    Alcohol use: Not Currently    Drug use: Never      Review of patient's allergies indicates:   Allergen Reactions    Nsaids (non-steroidal anti-inflammatory drug) Other (See Comments)     gastritis       Current Outpatient Medications:     albuterol (PROVENTIL/VENTOLIN HFA) 90 mcg/actuation inhaler, INHALE 2 PUFFS INTO THE LUNGS EVERY 6 HOURS AS NEEDED, Disp: , Rfl:     apixaban (ELIQUIS) 5 mg Tab, Take 5 mg by mouth 2 (two) times a day., Disp: , Rfl:     calcium carbonate/vitamin D3 (CALCIUM 600 + D,3, ORAL), Take 600 Units by mouth once daily., Disp: , Rfl:     fluorouraciL (EFUDEX) 5 % cream, Apply topically., Disp: , Rfl:     fluticasone propionate (FLONASE) 50 mcg/actuation nasal spray, , Disp: , Rfl:     gabapentin (NEURONTIN) 300 MG capsule, Take 300 mg by mouth every evening., Disp: , Rfl:      "loratadine 10 mg Cap, Take by mouth., Disp: , Rfl:     lovastatin (MEVACOR) 10 MG tablet, Take 10 mg by mouth., Disp: , Rfl:     methylPREDNISolone (MEDROL DOSEPACK) 4 mg tablet,  Disp: , Rfl:     montelukast (SINGULAIR) 10 mg tablet, Take 10 mg by mouth every evening., Disp: , Rfl:     multivit-min-FA-lycopen-lutein (CENTRUM SILVER) 0.4 mg-300 mcg- 250 mcg Tab, Take 1 capsule by mouth once daily., Disp: , Rfl:     nitrofurantoin (MACRODANTIN) 50 MG capsule, Take 50 mg by mouth once daily., Disp: , Rfl:     ondansetron (ZOFRAN) 4 MG tablet, Take 4 mg by mouth 2 (two) times daily as needed., Disp: , Rfl:     tiZANidine (ZANAFLEX) 4 MG tablet, Take 2-4 mg by mouth., Disp: , Rfl:     traMADoL (ULTRAM) 50 mg tablet, Take 50 mg by mouth 2 (two) times daily as needed., Disp: , Rfl:     valsartan-hydrochlorothiazide (DIOVAN-HCT) 320-25 mg per tablet, Take 1 tablet by mouth once daily., Disp: , Rfl:     Review of Systems:   Constitutional: no fever, chills or night sweats. No changes in weight   Eyes: no visual changes   ENT: no nasal congestion or sore throat   Respiratory: no cough or shortness of breath   Cardiovascular: no chest pain or palpitations   Gastrointestinal: no nausea or vomiting   Genitourinary: no hematuria or dysuria   Integument/Breast: no rash or pruritis   Hematologic/Lymphatic: no easy bruising or lymphadenopathy   Musculoskeletal: +back pain, neck pain, myalgias, arthralgias   Neurological: no seizures or tremors +paresthesias  Behavioral/Psych: no auditory or visual hallucinations   Endocrine: no heat or cold intolerance         OBJECTIVE:     Vital Signs (Most Recent):  Pulse: 83 (05/31/23 1338)  Resp: 18 (05/31/23 1338)  BP: (!) 153/73 (05/31/23 1338)  Estimated body mass index is 42.61 kg/m² as calculated from the following:    Height as of this encounter: 5' 6" (1.676 m).    Weight as of this encounter: 119.7 kg (264 lb).    Physical Exam:   General: well developed, well nourished, no distress. "   Neurologic: Alert and oriented. Thought content appropriate. GCS 15.   Head: normocephalic, atraumatic  Eyes: EOMI.  Neck: trachea midline, no JVD   Cardiovascular: no LE edema  Pulmonary: normal respirations, no signs of respiratory distress  Abdomen: non-distended  Sensory: diminished left ulnar distribution   Skin: Skin is warm, dry and intact.     Motor Strength: Moves all extremities spontaneously with good tone. No abnormal movements seen.      Strength   Deltoids Triceps Biceps Wrist Extension Wrist Flexion Hand  Interossei   Upper: R 5/5 5/5 5/5 5/5 5/5 5/5 4+/5     L 5/5 5/5 5/5 5/5 5/5 5/5 4/5       Iliopsoas Quadriceps Knee  Flexion Tibialis  anterior Gastro- cnemius EHL     Lower: R 5/5 5/5 5/5 5/5 5/5 5/5       L 5/5 5/5 5/5 5/5 5/5 5/5        DTR's: 1+  Boyd: absent   Gait: wide, slow                   Tandem Gait: deferred           Cervical Spine: mildly decreased ROM, TTP   +Tinel's left elbow         Diagnostic Results:  I have reviewed the following imaging    XR cervical spine  FINDINGS:  Redemonstrated operative changes of anterior fusion which appears solid at C4-C5, C5-C6, C6-C7, and C7-T1 with supplemental ventral screw and plate fixation at C4-C5 and C7-T1.  No acute bony changes or hardware complication identified.     Similar trace retrolisthesis of C3 on C4 with straightening of the expected normal cervical lordosis without evidence of abnormal motion between dynamic flexion and extension maneuvers.     Severe adjacent segment degenerative disc height loss at C3-C4 with osteophyte production and mild disc height loss at C2-C3.     No abnormal prevertebral soft tissue thickening.     Impression:     1. Grossly stable postsurgical and degenerative changes of the cervical spine as discussed above.        Electronically signed by: Dudely Correia  Date:                                            05/31/2023  Time:                                           11:32      MRI cervical  spine  FINDINGS:  Morphology: Substantial artifact related to the patient's previous C4-T1 ACDF changes.  Fusion appears solid allowing for regional artifact and marrow signal appears to be overall within normal limits aside from mild endplate centered marrow edema in the setting of advanced adjacent segment degenerative disc height loss at C3-C4.  Prominent ventral osteophyte production at C3-C4.     Alignment: Grade 1 retrolisthesis of C3 on C4 with straightening of the expected normal cervical lordosis..     Cord: Focal cord signal abnormality centrally with associated volume loss at C5-C6 consistent with myelomalacia in the absence of any new/acute symptoms to suggest a component of cord edema.  There is mild cord compression at C3-C4.  No other cord signal abnormality..     Craniocervical Junction: Cerebellar tonsils are normally positioned. The visualized portions of the posterior fossa are unremarkable. The regional osseous anatomy is normal.        Disc levels:        C2-C3: Mild left-sided facet arthrosis.  Shallow disc osteophyte complex.  The spinal canal and foramina remain patent..     C3-C4: Severe degenerative disc height loss and broad-based disc osteophyte complex/protrusion with ligamentum flavum thickening producing moderate narrowing of the spinal canal and mild cord compression with flattening of the ventral cord surface.  Uncovertebral spurring and mild facet arthrosis produces severe left and moderate right foraminal narrowing..     C4-C5: ACDF changes with osseous endplate spurring mildly narrowing the central/right central spinal canal.  Shallow uncovertebral spurring and facet arthrosis producing mild bilateral foraminal narrowing..     C5-C6: ACDF changes with osseous endplate spurring and ligamentum flavum thickening producing mild to moderate narrowing of the spinal canal contouring the ventral cord surface.  Uncovertebral spurring and facet arthrosis produces severe left and mild right  foraminal narrowing.     C6-C7: ACDF changes and shallow osseous endplate spurring lateralizing to the right producing mild narrowing of the spinal canal and contouring of the ventral cord surface.  Uncovertebral spurring and facet arthrosis produces moderate right and mild-to-moderate left foraminal narrowing..     C7-T1: ACDF changes in shallow osseous endplate spurring producing mild narrowing of the spinal canal.  Uncovertebral spurring and facet arthrosis produces mild bilateral foraminal narrowing..     Soft tissues: The visualized paraspinal soft tissues are within normal limits.        Impression:     1. Long segment ACDF changes spanning C4-T1.  Chronic appearing myelomalacia changes at C5-C6 in the absence of any acute symptomatology to suggest underlying component of cord edema.  2. Adjacent segment degenerative changes at C3-C4 are advanced contributing to moderate spinal canal narrowing and mild cord compression as well as severe left/moderate right foraminal narrowing.  3. Additional degenerative changes/postsurgical changes discussed above.        Electronically signed by: Dudley Correia  Date:                                            05/31/2023  Time:                                           12:30                        ASSESSMENT/PLAN:     Myelomalacia of cervical cord  -     Ambulatory referral/consult to Neurology; Future; Expected date: 06/12/2023    History of spinal fusion  -     Ambulatory referral/consult to Neurology; Future; Expected date: 06/12/2023          Nel Butler is a 62 y.o. female with a complex history. She has undergone multiple cervical fusions. She has cervical cord myelomalacia with myelopathy. She feels as though her symptoms are worsening. At her last visit, we discussed obtaining updated imaging to rule out new compression. We also discussed her symptoms may be a sequelae of her myelomalacia. She was referred to neurology for another opinion of this. She also reports  chronic headaches. She has been scheduled to see headache clinic.    She returns today after completing cervical spine imaging. The imaging has been reviewed with Dr. Ceja. She is fused C4-T1 with adjacent segment disease at C3-4. This is stable compared to her last MRI completed April 2022. She states her symptoms are not to the point for her to proceed with additional surgery. Her biggest concern is the headache which will likely persist after additional cervical spine surgery. She is scheduled to see headache clinic. We also discussed referral to general neurology. Some of her ongoing arm pain/tingling could be a sequelae of her cervical cord myelomalacia. Dr. Ceja is in agreement with this referral. She should follow up with our office for 6 month surveillance.         Patient verbalized understanding of plan. Encouraged to call with any questions or concerns.     This note was partially dictated using voice recognition software, so please excuse any errors that were not corrected.

## 2023-06-07 ENCOUNTER — TELEPHONE (OUTPATIENT)
Dept: NEUROLOGY | Facility: CLINIC | Age: 63
End: 2023-06-07
Payer: MEDICARE

## 2023-06-07 NOTE — TELEPHONE ENCOUNTER
Spoke to the pt, appt scheduled on 8/7/23 at 1100 with Dr. Chelo Sharif for weakness of the left arm.  Date, time and location discussed.

## 2023-12-20 ENCOUNTER — OFFICE VISIT (OUTPATIENT)
Dept: NEUROSURGERY | Facility: CLINIC | Age: 63
End: 2023-12-20
Payer: MEDICARE

## 2023-12-20 VITALS
HEART RATE: 74 BPM | RESPIRATION RATE: 18 BRPM | DIASTOLIC BLOOD PRESSURE: 74 MMHG | BODY MASS INDEX: 42.43 KG/M2 | WEIGHT: 264 LBS | SYSTOLIC BLOOD PRESSURE: 132 MMHG | HEIGHT: 66 IN

## 2023-12-20 DIAGNOSIS — Z98.1 HISTORY OF SPINAL FUSION: ICD-10-CM

## 2023-12-20 DIAGNOSIS — G95.89 MYELOMALACIA OF CERVICAL CORD: Primary | ICD-10-CM

## 2023-12-20 DIAGNOSIS — M47.812 CERVICAL SPONDYLOSIS: ICD-10-CM

## 2023-12-20 PROCEDURE — 4010F ACE/ARB THERAPY RXD/TAKEN: CPT | Mod: CPTII,S$GLB,, | Performed by: PHYSICIAN ASSISTANT

## 2023-12-20 PROCEDURE — 3078F PR MOST RECENT DIASTOLIC BLOOD PRESSURE < 80 MM HG: ICD-10-PCS | Mod: CPTII,S$GLB,, | Performed by: PHYSICIAN ASSISTANT

## 2023-12-20 PROCEDURE — 1159F PR MEDICATION LIST DOCUMENTED IN MEDICAL RECORD: ICD-10-PCS | Mod: CPTII,S$GLB,, | Performed by: PHYSICIAN ASSISTANT

## 2023-12-20 PROCEDURE — 4010F PR ACE/ARB THEARPY RXD/TAKEN: ICD-10-PCS | Mod: CPTII,S$GLB,, | Performed by: PHYSICIAN ASSISTANT

## 2023-12-20 PROCEDURE — 99213 PR OFFICE/OUTPT VISIT, EST, LEVL III, 20-29 MIN: ICD-10-PCS | Mod: S$GLB,,, | Performed by: PHYSICIAN ASSISTANT

## 2023-12-20 PROCEDURE — 3008F BODY MASS INDEX DOCD: CPT | Mod: CPTII,S$GLB,, | Performed by: PHYSICIAN ASSISTANT

## 2023-12-20 PROCEDURE — 3078F DIAST BP <80 MM HG: CPT | Mod: CPTII,S$GLB,, | Performed by: PHYSICIAN ASSISTANT

## 2023-12-20 PROCEDURE — 1160F PR REVIEW ALL MEDS BY PRESCRIBER/CLIN PHARMACIST DOCUMENTED: ICD-10-PCS | Mod: CPTII,S$GLB,, | Performed by: PHYSICIAN ASSISTANT

## 2023-12-20 PROCEDURE — 3075F SYST BP GE 130 - 139MM HG: CPT | Mod: CPTII,S$GLB,, | Performed by: PHYSICIAN ASSISTANT

## 2023-12-20 PROCEDURE — 1159F MED LIST DOCD IN RCRD: CPT | Mod: CPTII,S$GLB,, | Performed by: PHYSICIAN ASSISTANT

## 2023-12-20 PROCEDURE — 3075F PR MOST RECENT SYSTOLIC BLOOD PRESS GE 130-139MM HG: ICD-10-PCS | Mod: CPTII,S$GLB,, | Performed by: PHYSICIAN ASSISTANT

## 2023-12-20 PROCEDURE — 1160F RVW MEDS BY RX/DR IN RCRD: CPT | Mod: CPTII,S$GLB,, | Performed by: PHYSICIAN ASSISTANT

## 2023-12-20 PROCEDURE — 99213 OFFICE O/P EST LOW 20 MIN: CPT | Mod: S$GLB,,, | Performed by: PHYSICIAN ASSISTANT

## 2023-12-20 PROCEDURE — 3008F PR BODY MASS INDEX (BMI) DOCUMENTED: ICD-10-PCS | Mod: CPTII,S$GLB,, | Performed by: PHYSICIAN ASSISTANT

## 2023-12-20 RX ORDER — POTASSIUM CHLORIDE 750 MG/1
10 CAPSULE, EXTENDED RELEASE ORAL DAILY PRN
COMMUNITY
Start: 2023-12-12

## 2023-12-20 RX ORDER — FUROSEMIDE 40 MG/1
40 TABLET ORAL
COMMUNITY
Start: 2023-06-28 | End: 2024-06-27

## 2023-12-20 RX ORDER — VALSARTAN 160 MG/1
160 TABLET ORAL
COMMUNITY

## 2023-12-20 NOTE — PROGRESS NOTES
[facility]  Clinic Consult       PCP: Sheree, Primary Doctor    SUBJECTIVE:     Chief Complaint:   Chief Complaint   Patient presents with    Back Pain     Patient present to clinic today as neck/back pain         Interval History 12/20/2023  Patient returns for follow up/surveillance.  Weakness has remained stable. She feels as though maybe the pain in the neck is worsening. It is radiating to the upper back with prolonged standing. She is scheduled to see neurology in March.       Interval History 5/31/2023  Returns with new imaging. No changes since her last visit. Left arm pain/weakness stable. She does have a visit with headache clinic.       Interval History 5/10/2023  Patient returns with dizziness and continued neck pain. It radiates into the left arm with weakness. She also reports headaches. She has cervical cord myelomalacia.     Pertinent and recent history, provider evaluations, imaging and data reviewed in [facility]      Interval History 5/25/2022  Nel Butler is a 62 yo female who presents for evaluation of neck pain, arm pain and arm weakness. Patient reports worsening of the left sided neck pain and left arm pain. She reports pain in the C8 distribution of the left arm with associated numbness/tingling. She feels as though her arm is weak, especially her hand. She does report worsening hand dexterity and more frequently dropping objects. She continues to experience mid back and low back pain. Since her last visit, she was put on Eliquis for her afib. She was hospitalized due to her left arm pain and underwent complete cardiac workup.       History of Present Illness 2/19/2020  Nel Butler is a 59 y.o. female with Afib, HTN, LV dysfunction, pulmonary hypertension who presents for evaluation of neck and back pain.  Patient reports history of cervical fusion.  She reports in 2003 she experienced acute neck pain with weakness in the left arm.  She underwent  anterior cervical fusion.  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  She underwent extension of the fusion.  She is now fused C4-T1.  She reports chronic neck pain following the surgery.  She reports bilateral hand numbness and tingling.  She also complains of chronic low back pain that radiates into the right leg greater than left leg, with associated numbness and tingling.  She is status post right knee replacement and recently had blood drawn off of the right knee.  She reports she cannot sit for long periods of time.  She does not sleep well due to the pain. She has a spinal cord stimulator, [employer], placed in January 2019.  She states the stimulator does not touch her leg pain.  He sees pain management and last received injections 2-3 years ago.        Past Medical History:   Diagnosis Date    HLD (hyperlipidemia)     HTN (hypertension)     Osteopenia     Paroxysmal atrial fibrillation      Past Surgical History:   Procedure Laterality Date    CERVICAL FUSION      EPIDURAL STEROID INJECTION INTO CERVICAL SPINE N/A 6/29/2022    Procedure: Injection-steroid-epidural-cervical;  Surgeon: Jc Martell MD;  Location: [facility] OR;  Service: Pain Management;  Laterality: N/A;    KNEE ARTHROPLASTY Right     SPINAL CORD STIMULATOR IMPLANT  2019     History reviewed. No pertinent family history.  Social History     Tobacco Use    Smoking status: Never   Substance Use Topics    Alcohol use: Not Currently    Drug use: Never      Review of patient's allergies indicates:   Allergen Reactions    Nsaids (non-steroidal anti-inflammatory drug) Other (See Comments)     gastritis       Current Outpatient Medications:     albuterol (PROVENTIL/VENTOLIN HFA) 90 mcg/actuation inhaler, INHALE 2 PUFFS INTO THE LUNGS EVERY 6 HOURS AS NEEDED, Disp: , Rfl:     apixaban (ELIQUIS) 5 mg Tab, Take 5 mg by mouth 2 (two) times a day., Disp: , Rfl:     calcium carbonate/vitamin D3 (CALCIUM 600 +  D,3, ORAL), Take 600 Units by mouth once daily., Disp: , Rfl:     fluorouraciL (EFUDEX) 5 % cream, Apply topically., Disp: , Rfl:     fluticasone propionate (FLONASE) 50 mcg/actuation nasal spray, , Disp: , Rfl:     gabapentin (NEURONTIN) 300 MG capsule, Take 300 mg by mouth every evening., Disp: , Rfl:     loratadine 10 mg Cap, Take by mouth., Disp: , Rfl:     lovastatin (MEVACOR) 10 MG tablet, Take 10 mg by mouth., Disp: , Rfl:     methylPREDNISolone (MEDROL DOSEPACK) 4 mg tablet,  Disp: , Rfl:     montelukast (SINGULAIR) 10 mg tablet, Take 10 mg by mouth every evening., Disp: , Rfl:     multivit-min-FA-lycopen-lutein (CENTRUM SILVER) 0.4 mg-300 mcg- 250 mcg Tab, Take 1 capsule by mouth once daily., Disp: , Rfl:     nitrofurantoin (MACRODANTIN) 50 MG capsule, Take 50 mg by mouth once daily., Disp: , Rfl:     ondansetron (ZOFRAN) 4 MG tablet, Take 4 mg by mouth 2 (two) times daily as needed., Disp: , Rfl:     tiZANidine (ZANAFLEX) 4 MG tablet, Take 2-4 mg by mouth., Disp: , Rfl:     traMADoL (ULTRAM) 50 mg tablet, Take 50 mg by mouth 2 (two) times daily as needed., Disp: , Rfl:     valsartan-hydrochlorothiazide (DIOVAN-HCT) 320-25 mg per tablet, Take 1 tablet by mouth once daily., Disp: , Rfl:     Review of Systems:   Constitutional: no fever, chills or night sweats. No changes in weight   Eyes: no visual changes   ENT: no nasal congestion or sore throat   Respiratory: no cough or shortness of breath   Cardiovascular: no chest pain or palpitations   Gastrointestinal: no nausea or vomiting   Genitourinary: no hematuria or dysuria   Integument/Breast: no rash or pruritis   Hematologic/Lymphatic: no easy bruising or lymphadenopathy   Musculoskeletal: +back pain, neck pain, myalgias, arthralgias   Neurological: no seizures or tremors +paresthesias  Behavioral/Psych: no auditory or visual hallucinations   Endocrine: no heat or cold intolerance         OBJECTIVE:     Vital Signs (Most Recent):  Pulse: 74 (12/20/23  "1108)  Resp: 18 (12/20/23 1108)  BP: 132/74 (12/20/23 1108)  Estimated body mass index is 42.61 kg/m² as calculated from the following:    Height as of this encounter: 5' 6" (1.676 m).    Weight as of this encounter: 119.7 kg (264 lb).    Physical Exam:   General: well developed, well nourished, no distress.   Neurologic: Alert and oriented. Thought content appropriate. GCS 15.   Head: normocephalic, atraumatic  Eyes: EOMI.  Neck: trachea midline, no JVD   Cardiovascular: no LE edema  Pulmonary: normal respirations, no signs of respiratory distress  Abdomen: non-distended  Sensory: diminished left ulnar distribution   Skin: Skin is warm, dry and intact.     Motor Strength: Moves all extremities spontaneously with good tone. No abnormal movements seen.      Strength   Deltoids Triceps Biceps Wrist Extension Wrist Flexion Hand  Interossei   Upper: R 5/5 5/5 5/5 5/5 5/5 5/5 4+/5     L 5/5 5/5 5/5 5/5 5/5 5/5 4/5       Iliopsoas Quadriceps Knee  Flexion Tibialis  anterior Gastro- cnemius EHL     Lower: R 5/5 5/5 5/5 5/5 5/5 5/5       L 5/5 5/5 5/5 5/5 5/5 5/5        DTR's: 1+  Boyd: absent   Gait: wide, slow                   Tandem Gait: deferred           Cervical Spine: mildly decreased ROM, TTP   +Tinel's left elbow         Diagnostic Results:  No new imaging       ASSESSMENT/PLAN:     Myelomalacia of cervical cord    History of spinal fusion    Cervical spondylosis            Nel Butler is a 63 y.o. female with a complex history. She has undergone multiple cervical fusions. She has cervical cord myelomalacia with myelopathy. She previously felt as though her symptoms were worsening. She completed updated cervical spine imaging and this was reviewed at her last visit. She is fused C4-T1 with adjacent segment disease at C3-4. This is stable compared to her MRI completed April 2022. She stated her symptoms were not to the point for her to proceed with additional surgery. Her biggest concern was the " headache which will likely persist after additional cervical spine surgery. We also discussed referral to general neurology. Some of her ongoing arm pain/tingling could be a sequelae of her cervical cord myelomalacia.     She presents today for surveillance follow up. Overall is she stable. She is still not ready to consider surgery. She will see neurology in March for consultation.         Patient verbalized understanding of plan. Encouraged to call with any questions or concerns.     This note was partially dictated using voice recognition software, so please excuse any errors that were not corrected.

## 2025-04-02 ENCOUNTER — OFFICE VISIT (OUTPATIENT)
Dept: NEUROSURGERY | Facility: CLINIC | Age: 65
End: 2025-04-02
Payer: MEDICARE

## 2025-04-02 VITALS
HEART RATE: 72 BPM | DIASTOLIC BLOOD PRESSURE: 62 MMHG | HEIGHT: 66 IN | WEIGHT: 263.88 LBS | SYSTOLIC BLOOD PRESSURE: 118 MMHG | RESPIRATION RATE: 16 BRPM | BODY MASS INDEX: 42.41 KG/M2 | TEMPERATURE: 98 F

## 2025-04-02 DIAGNOSIS — Z98.1 HISTORY OF SPINAL FUSION: ICD-10-CM

## 2025-04-02 DIAGNOSIS — G95.89 MYELOMALACIA OF CERVICAL CORD: Primary | ICD-10-CM

## 2025-04-02 DIAGNOSIS — R55 SYNCOPE, UNSPECIFIED SYNCOPE TYPE: ICD-10-CM

## 2025-04-02 PROCEDURE — 3078F DIAST BP <80 MM HG: CPT | Mod: CPTII,S$GLB,, | Performed by: STUDENT IN AN ORGANIZED HEALTH CARE EDUCATION/TRAINING PROGRAM

## 2025-04-02 PROCEDURE — 1159F MED LIST DOCD IN RCRD: CPT | Mod: CPTII,S$GLB,, | Performed by: STUDENT IN AN ORGANIZED HEALTH CARE EDUCATION/TRAINING PROGRAM

## 2025-04-02 PROCEDURE — 1101F PT FALLS ASSESS-DOCD LE1/YR: CPT | Mod: CPTII,S$GLB,, | Performed by: STUDENT IN AN ORGANIZED HEALTH CARE EDUCATION/TRAINING PROGRAM

## 2025-04-02 PROCEDURE — 3288F FALL RISK ASSESSMENT DOCD: CPT | Mod: CPTII,S$GLB,, | Performed by: STUDENT IN AN ORGANIZED HEALTH CARE EDUCATION/TRAINING PROGRAM

## 2025-04-02 PROCEDURE — 3008F BODY MASS INDEX DOCD: CPT | Mod: CPTII,S$GLB,, | Performed by: STUDENT IN AN ORGANIZED HEALTH CARE EDUCATION/TRAINING PROGRAM

## 2025-04-02 PROCEDURE — 4010F ACE/ARB THERAPY RXD/TAKEN: CPT | Mod: CPTII,S$GLB,, | Performed by: STUDENT IN AN ORGANIZED HEALTH CARE EDUCATION/TRAINING PROGRAM

## 2025-04-02 PROCEDURE — 1125F AMNT PAIN NOTED PAIN PRSNT: CPT | Mod: CPTII,S$GLB,, | Performed by: STUDENT IN AN ORGANIZED HEALTH CARE EDUCATION/TRAINING PROGRAM

## 2025-04-02 PROCEDURE — 99214 OFFICE O/P EST MOD 30 MIN: CPT | Mod: S$GLB,,, | Performed by: STUDENT IN AN ORGANIZED HEALTH CARE EDUCATION/TRAINING PROGRAM

## 2025-04-02 PROCEDURE — 3074F SYST BP LT 130 MM HG: CPT | Mod: CPTII,S$GLB,, | Performed by: STUDENT IN AN ORGANIZED HEALTH CARE EDUCATION/TRAINING PROGRAM

## 2025-04-02 RX ORDER — METFORMIN HYDROCHLORIDE 500 MG/1
500 TABLET ORAL 2 TIMES DAILY WITH MEALS
COMMUNITY

## 2025-04-02 RX ORDER — SEMAGLUTIDE 1.34 MG/ML
1 INJECTION, SOLUTION SUBCUTANEOUS
COMMUNITY

## 2025-04-02 RX ORDER — METOPROLOL TARTRATE 25 MG/1
25 TABLET, FILM COATED ORAL 2 TIMES DAILY
COMMUNITY

## 2025-04-02 RX ORDER — OMEPRAZOLE 40 MG/1
40 CAPSULE, DELAYED RELEASE ORAL
COMMUNITY

## 2025-04-02 RX ORDER — SPIRONOLACTONE 25 MG/1
12.5 TABLET ORAL DAILY
COMMUNITY

## 2025-04-02 NOTE — PROGRESS NOTES
"Neurosurgery History & Physical    Patient ID: Nel Butler is a 64 y.o. female.    Chief Complaint   Patient presents with    Neck Pain           Back Pain     HPI:  64-year-old female with a history from another surgeon a C4-T1 ACDF, 2 surgery.  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  Review of patient's allergies indicates:   Allergen Reactions    Nsaids (non-steroidal anti-inflammatory drug) Other (See Comments)     gastritis     Current Medications[2]  Blood pressure 118/62, pulse 72, temperature 98 °F (36.7 °C), resp. rate 16, height 5' 6" (1.676 m), weight 119.7 kg (263 lb 14.3 oz).      Neurological Exam  General: well developed, well nourished, no distress.   Neurologic: Alert and oriented. Thought content appropriate.  Cranial nerves: face symmetric, EOMI.   Motor Strength: Moves all extremities spontaneously with good tone. No abnormal movements seen.      Strength   Deltoids Triceps Biceps Wrist Extension Wrist Flexion Hand    Upper: R 5/5 5/5 5/5 5/5 5/5 5/5     L 5/5 5/5 5/5 5/5 5/5 5/5       Iliopsoas Quadriceps Knee  Flexion Tibialis  anterior Gastro- cnemius EHL   Lower: R 5/5 5/5 5/5 5/5 5/5 5/5     L 5/5 5/5 5/5 5/5 5/5 5/5      Sensory: intact to light touch throughout  DTR's - 0 + and symmetric in UE and LE  Boyd: absent  Clonus: absent  Pulm: Normal respiratory effort  Skin: Intact, no visible rashes or lesions       Imaging:  MRI C spine from 2023:  Impression:     1. Long segment ACDF changes " spanning C4-T1.  Chronic appearing myelomalacia changes at C5-C6 in the absence of any acute symptomatology to suggest underlying component of cord edema.  2. Adjacent segment degenerative changes at C3-C4 are advanced contributing to moderate spinal canal narrowing and mild cord compression as well as severe left/moderate right foraminal narrowing.  3. Additional degenerative changes/postsurgical changes discussed above.       Assessment/Plan:  No diagnosis found.    64-year-old female a remote history by another surgeon of the C four to T1 ACDF  She is not presenting with myelopathic symptoms, no changes in upper extremity function sensation  Or changes of mobility or balance outside of episode      These episodes include sweating nausea feelings of passing out and dizziness  She is working with ENT currently  She has a cardiac history and a pacemaker I have recommended she follow up with Cardiology for evaluation  She has a consultation with Neurology               [1]   Social History  Socioeconomic History    Marital status:    Tobacco Use    Smoking status: Never   Substance and Sexual Activity    Alcohol use: Not Currently    Drug use: Never   [2]   Current Outpatient Medications:     albuterol (PROVENTIL/VENTOLIN HFA) 90 mcg/actuation inhaler, INHALE 2 PUFFS INTO THE LUNGS EVERY 6 HOURS AS NEEDED, Disp: , Rfl:     apixaban (ELIQUIS) 5 mg Tab, Take 5 mg by mouth 2 (two) times a day., Disp: , Rfl:     calcium carbonate/vitamin D3 (CALCIUM 600 + D,3, ORAL), Take 600 Units by mouth once daily., Disp: , Rfl:     fluticasone propionate (FLONASE) 50 mcg/actuation nasal spray, , Disp: , Rfl:     furosemide (LASIX) 40 MG tablet, Take 40 mg by mouth., Disp: , Rfl:     loratadine 10 mg Cap, Take by mouth., Disp: , Rfl:     lovastatin (MEVACOR) 10 MG tablet, Take 10 mg by mouth., Disp: , Rfl:     metFORMIN (GLUCOPHAGE) 500 MG tablet, Take 500 mg by mouth 2 (two) times daily with meals., Disp: , Rfl:      montelukast (SINGULAIR) 10 mg tablet, Take 10 mg by mouth every evening., Disp: , Rfl:     multivit-min-FA-lycopen-lutein (CENTRUM SILVER) 0.4 mg-300 mcg- 250 mcg Tab, Take 1 capsule by mouth once daily., Disp: , Rfl:     omeprazole (PRILOSEC) 40 MG capsule, Take 40 mg by mouth 2 (two) times daily before meals., Disp: , Rfl:     ondansetron (ZOFRAN) 4 MG tablet, Take 4 mg by mouth 2 (two) times daily as needed., Disp: , Rfl:     potassium chloride (MICRO-K) 10 MEQ CpSR, Take 10 mEq by mouth daily as needed., Disp: , Rfl:     semaglutide (OZEMPIC) 1 mg/dose (4 mg/3 mL), Inject 1 mg into the skin every 7 days., Disp: , Rfl:     spironolactone (ALDACTONE) 25 MG tablet, Take 12.5 mg by mouth once daily., Disp: , Rfl:     traMADoL (ULTRAM) 50 mg tablet, Take 50 mg by mouth 2 (two) times daily as needed., Disp: , Rfl:     valsartan (DIOVAN) 160 MG tablet, Take 160 mg by mouth., Disp: , Rfl:     metoprolol tartrate (LOPRESSOR) 25 MG tablet, Take 25 mg by mouth 2 (two) times daily., Disp: , Rfl:     tiZANidine (ZANAFLEX) 4 MG tablet, Take 2-4 mg by mouth. (Patient not taking: Reported on 4/2/2025), Disp: , Rfl:

## 2025-04-10 ENCOUNTER — TELEPHONE (OUTPATIENT)
Dept: NEUROSURGERY | Facility: CLINIC | Age: 65
End: 2025-04-10
Payer: MEDICARE

## 2025-04-10 NOTE — TELEPHONE ENCOUNTER
----- Message from Shawna sent at 4/10/2025  1:01 PM CDT -----  Regarding: Sooner Appointment Reschedule Request  Contact: patient at [phone]  Type:  Sooner Appointment Reschedule RequestName of Caller:  patient at [phone] is the first available appointment?  06/05/2025Additional Information:  Patient is trying to reschedule her canceled appointments for Dr. Ceja. She would like to have the Xray and MRI on the same date at the same place. I did make her aware of the early morning and extreme late times for the MRI at [facility]. She would like to get all appointments, including doctor's, on the same day. Please call and advise. Thank you

## 2025-07-11 ENCOUNTER — HOSPITAL ENCOUNTER (OUTPATIENT)
Dept: RADIOLOGY | Facility: HOSPITAL | Age: 65
Discharge: HOME | End: 2025-07-11
Attending: STUDENT IN AN ORGANIZED HEALTH CARE EDUCATION/TRAINING PROGRAM
Payer: MEDICARE

## 2025-07-11 DIAGNOSIS — G95.89 MYELOMALACIA OF CERVICAL CORD: ICD-10-CM

## 2025-07-11 DIAGNOSIS — Z98.1 HISTORY OF SPINAL FUSION: ICD-10-CM

## 2025-07-11 PROCEDURE — 72050 X-RAY EXAM NECK SPINE 4/5VWS: CPT | Mod: TC

## 2025-07-11 PROCEDURE — 72050 X-RAY EXAM NECK SPINE 4/5VWS: CPT | Mod: 26,,, | Performed by: RADIOLOGY

## 2025-07-14 ENCOUNTER — TELEPHONE (OUTPATIENT)
Dept: NEUROSURGERY | Facility: CLINIC | Age: 65
End: 2025-07-14
Payer: MEDICARE

## 2025-07-14 DIAGNOSIS — M54.2 CERVICALGIA: Primary | ICD-10-CM

## 2025-07-14 NOTE — TELEPHONE ENCOUNTER
Copied from CRM #[id number]. Topic: General Inquiry - Patient Advice  >> Jul 11, 2025  2:40 PM Ebenezer wrote:  Pt is calling the office to have her MRI orders re-instated and to get scheduled at the [place], [place] location. It is a [facility] facility. Please call back to advise. Thanks!

## 2025-07-14 NOTE — TELEPHONE ENCOUNTER
Spoke with patient and relayed Kendall's result note. Patient voiced understanding and had no further questions

## 2025-08-07 ENCOUNTER — TELEPHONE (OUTPATIENT)
Dept: NEUROSURGERY | Facility: CLINIC | Age: 65
End: 2025-08-07
Payer: MEDICARE

## 2025-08-11 ENCOUNTER — TELEPHONE (OUTPATIENT)
Dept: NEUROSURGERY | Facility: CLINIC | Age: 65
End: 2025-08-11
Payer: MEDICARE

## (undated) DEVICE — TRAY NERVE BLOCK

## (undated) DEVICE — SYR GLASS 5CC LUER LOK

## (undated) DEVICE — SOL SOD CHLORIDE 0.9% 10ML

## (undated) DEVICE — GLOVE 7.5 PROTEXIS PI MICRO

## (undated) DEVICE — APPLICATOR CHLORAPREP CLR 10.5